# Patient Record
Sex: FEMALE | Race: WHITE | NOT HISPANIC OR LATINO | Employment: FULL TIME | URBAN - NONMETROPOLITAN AREA
[De-identification: names, ages, dates, MRNs, and addresses within clinical notes are randomized per-mention and may not be internally consistent; named-entity substitution may affect disease eponyms.]

---

## 2017-09-05 ENCOUNTER — OFFICE VISIT (OUTPATIENT)
Dept: FAMILY MEDICINE | Facility: CLINIC | Age: 67
End: 2017-09-05
Payer: COMMERCIAL

## 2017-09-05 VITALS
OXYGEN SATURATION: 99 % | SYSTOLIC BLOOD PRESSURE: 120 MMHG | BODY MASS INDEX: 21.46 KG/M2 | WEIGHT: 125 LBS | HEART RATE: 72 BPM | TEMPERATURE: 97.3 F | DIASTOLIC BLOOD PRESSURE: 62 MMHG | RESPIRATION RATE: 14 BRPM

## 2017-09-05 DIAGNOSIS — Z78.0 ASYMPTOMATIC POSTMENOPAUSAL STATUS: ICD-10-CM

## 2017-09-05 DIAGNOSIS — I10 ESSENTIAL HYPERTENSION WITH GOAL BLOOD PRESSURE LESS THAN 140/90: ICD-10-CM

## 2017-09-05 DIAGNOSIS — E78.5 HYPERLIPIDEMIA LDL GOAL <160: Primary | ICD-10-CM

## 2017-09-05 PROCEDURE — 99214 OFFICE O/P EST MOD 30 MIN: CPT | Performed by: NURSE PRACTITIONER

## 2017-09-05 RX ORDER — METOPROLOL SUCCINATE 25 MG/1
12.5 TABLET, EXTENDED RELEASE ORAL DAILY
Qty: 45 TABLET | Refills: 3 | Status: SHIPPED | OUTPATIENT
Start: 2017-09-05 | End: 2018-09-12

## 2017-09-05 NOTE — NURSING NOTE
"Chief Complaint   Patient presents with     Hypertension     recheck        Initial There were no vitals taken for this visit. Estimated body mass index is 22.55 kg/(m^2) as calculated from the following:    Height as of 9/27/16: 5' 4\" (1.626 m).    Weight as of 9/27/16: 131 lb 6.4 oz (59.6 kg).  Medication Reconciliation: complete    Health Maintenance that is potentially due pending provider review:  NONE    n/a    Is there anyone who you would like to be able to receive your results? No  If yes have patient fill out FRAN[facility]    "

## 2017-09-05 NOTE — PROGRESS NOTES
SUBJECTIVE:   Clarita Lowry is a 67 year old female who presents to clinic today for the following health issues:      Hypertension Follow-up      Outpatient blood pressures are not being checked.    Low Salt Diet: no added salt    Amount of exercise or physical activity:daily walking at work. No step tracking.     Problems taking medications regularly: No    Medication side effects: none  Diet: regular (no restrictions)    BP Readings from Last 3 Encounters:   09/05/17 120/62   09/27/16 130/64   03/08/16 134/60     Creatinine   Date Value Ref Range Status   09/27/2016 0.73 0.52 - 1.04 mg/dL Final       Non smoker  New puppy.     Sleep has been good. No problems with meds  Working 2-11 pm. Working [employer]  BP has been good.       -------------------------------------    Problem list and histories reviewed & adjusted, as indicated.  Additional history: as documented    Labs reviewed in EPIC    Reviewed and updated as needed this visit by clinical staffAllergies       Reviewed and updated as needed this visit by Provider         ROS:   ROS: 10 point ROS neg other than the symptoms noted above in the HPI.      OBJECTIVE:                                                    /62  Pulse 72  Temp 97.3  F (36.3  C) (Tympanic)  Resp 14  Wt 125 lb (56.7 kg)  SpO2 99%  BMI 21.46 kg/m2  Body mass index is 21.46 kg/(m^2).   GENERAL: healthy, alert, well nourished, well hydrated, no distress  HENT: ear canals- normal; TMs- normal; Nose- normal; Mouth- no ulcers, no lesions  NECK: no tenderness, no adenopathy, no asymmetry, no masses, no stiffness; thyroid- normal to palpation  RESP: lungs clear to auscultation - no rales, no rhonchi, no wheezes  CV: regular rates and rhythm, normal S1 S2, no S3 or S4 and no murmur, no click or rub -  ABDOMEN: soft, no tenderness, no  hepatosplenomegaly, no masses, normal bowel sounds  MS: extremities- no gross deformities noted, no edema  NEURO: strength and tone-  normal, sensory exam- grossly normal, mentation- intact, speech- normal, reflexes- symmetric  PSYCH: Alert and oriented times 3; speech- coherent , normal rate and volume; able to articulate logical thoughts, able to abstract reason, no tangential thoughts, no hallucinations or delusions, affect- normal    Diagnostic test results:  Results for orders placed or performed during the hospital encounter of 12/19/16   *MA Screening Digital Bilateral    Narrative    MA SCREENING DIGITAL BILATERAL 12/19/2016 10:15 AM    HISTORY:  Screening.  No new breast complaints.    COMPARISON:  12/16/2015, 12/8/2014, 12/4/2013, 11/26/2012    TECHNIQUE:  Digital mammography with CAD is performed.    BREAST DENSITY: Heterogeneously dense.    COMMENTS: No findings of suspicion for malignancy.       Impression    IMPRESSION: BI-RADS CATEGORY: 1 -  NEGATIVE.    RECOMMENDED FOLLOW-UP: Annual Mammography.    PETRA RYAN MD          ASSESSMENT/PLAN:                                                    1. Essential hypertension with goal blood pressure less than 140/90  Continue current meds.  RTC fasting for labs  - metoprolol (TOPROL-XL) 25 MG 24 hr tablet; Take 0.5 tablets (12.5 mg) by mouth daily  Dispense: 45 tablet; Refill: 3  - **Hepatitis C Screen Reflex to RNA FUTURE anytime; Future  - **Comprehensive metabolic panel FUTURE anytime; Future  - **Lipid panel reflex to direct LDL FUTURE 2mo; Future    2. Hyperlipidemia LDL goal <160  Labs orders placed.   Continue to work on daily activity and low fat diet.     3. Asymptomatic postmenopausal status  DEXA scan recommended.   - DX Hip/Pelvis/Spine; Future      Follow up with Provider - Call or return to the clinic with any worsening of symptoms or no resolution. Patient/Parent verbalized understanding and is in agreement. Medication side effects reviewed.   Current Outpatient Prescriptions   Medication Sig Dispense Refill     metoprolol (TOPROL-XL) 25 MG 24 hr tablet Take 0.5 tablets (12.5  mg) by mouth daily 45 tablet 3     aspirin 81 MG tablet Take by mouth daily 30 tablet         See Patient Instructions    LANA Wise [facility]

## 2017-09-05 NOTE — PATIENT INSTRUCTIONS
"  Discharge Instructions for High Blood Pressure (Hypertension)  You have been diagnosed with high blood pressure (also called hypertension). This means the force of blood against your artery walls is too strong. It also means your heart is working hard to move blood. High blood pressure usually has no symptoms, but over time, it can damage your heart, blood vessels, eyes, kidneys, and other organs. With help from your doctor, you can manage your blood pressure and protect your health.  Taking medicine    Learn to take your own blood pressure. Keep a record of your results. Ask your doctor which readings mean that you need medical attention.    Take your blood pressure medicine exactly as directed. Don t skip doses. Missing doses can cause your blood pressure to get out of control.    If you do miss a dose (or doses) check with your healthcare provider about what to do.    Avoid medicine that contain heart stimulants, including over-the-counter drugs. Check for warnings about high blood pressure on the label. Ask the pharmacist before purchasing something you haven't used before    Check with your doctor or pharmacist before taking a decongestant. Some decongestants can worsen high blood pressure.  Lifestyle changes    Maintain a healthy weight. Get help to lose any extra pounds.    Cut back on salt.    Limit canned, dried, packaged, and fast foods.    Don t add salt to your food at the table.    Season foods with herbs instead of salt when you cook.    Request no added salt when you go to a restaurant.    The American Heart Association s (AHA) \"ideal\" sodium intake recommendation is 1,500 milligrams per day.  However, since American's eat so much salt, the AHA says a positive change can occur by cutting back to even 2,400 milligrams of sodium a day.     Follow the DASH (Dietary Approaches to Stop Hypertension) eating plan. This plan recommends vegetables, fruits, whole gains, and other heart healthy foods.    Begin " an exercise program. Ask your doctor how to get started. The American Heart Association recommends aerobic exercise 3 to 4 times a week for an average of 40 minutes at a time, with your doctor's approval. Simple activities like walking or gardening can help.    Break the smoking habit. Enroll in a stop-smoking program to improve your chances of success. Ask your healthcare provider about programs and medicines to help you stop smoking.    Limit drinks that contain caffeine (coffee, black or green tea, cola) to 2 per day.    Never take stimulants such as amphetamines or cocaine; these drugs can be deadly for someone with high blood pressure.    Control your stress. Learn stress-management techniques.    Limit alcohol to no more than 1 drink a day for women and 2 drinks a day for men.  Follow-up care  Make a follow-up appointment as directed by our staff.     When to seek medical care  Call your doctor immediately or seek emergency care if you have any of the following:    Chest pain or shortness of breath (call 911)    Moderate to severe headache    Weakness in the muscles of your face, arms, or legs    Trouble speaking    Extreme drowsiness    Confusion    Fainting or dizziness    Pulsating or rushing sound in your ears    Unexplained nosebleed    Weakness, tingling, or numbness of your face, arms, or legs    Change in vision    Blood pressure measured at home that is greater than 180/110   Date Last Reviewed: 4/27/2016 2000-2017 [employer]. [address] [place] [address], [place] [address]. All rights reserved. This information is not intended as a substitute for professional medical care. Always follow your healthcare professional's instructions.

## 2017-09-05 NOTE — LETTER
[facility]  [address] [facility] [address]  [phone]        January 8, 2018    Clarita Lowry  [address] [facility] [address]              Dear Clarita Lowry    This is to remind you that your fasting lab is due.    You may call our office at  to schedule an appointment.    Please disregard this notice if you have already had your labs drawn or made an appointment.        Sincerely,        Claire Amos CNP

## 2017-10-16 ENCOUNTER — HOSPITAL ENCOUNTER (OUTPATIENT)
Dept: BONE DENSITY | Facility: CLINIC | Age: 67
Discharge: HOME | End: 2017-10-16
Attending: NURSE PRACTITIONER | Admitting: NURSE PRACTITIONER
Payer: COMMERCIAL

## 2017-10-16 DIAGNOSIS — Z78.0 ASYMPTOMATIC POSTMENOPAUSAL STATUS: ICD-10-CM

## 2017-10-16 PROCEDURE — 77080 DXA BONE DENSITY AXIAL: CPT

## 2018-01-03 ENCOUNTER — HOSPITAL ENCOUNTER (OUTPATIENT)
Dept: MAMMOGRAPHY | Facility: CLINIC | Age: 68
Discharge: HOME | End: 2018-01-03
Attending: NURSE PRACTITIONER | Admitting: NURSE PRACTITIONER
Payer: COMMERCIAL

## 2018-01-03 DIAGNOSIS — Z12.31 VISIT FOR SCREENING MAMMOGRAM: ICD-10-CM

## 2018-01-03 PROCEDURE — 77067 SCR MAMMO BI INCL CAD: CPT

## 2018-01-12 DIAGNOSIS — I10 ESSENTIAL HYPERTENSION WITH GOAL BLOOD PRESSURE LESS THAN 140/90: ICD-10-CM

## 2018-01-12 LAB
ALBUMIN SERPL-MCNC: 3.9 G/DL (ref 3.4–5)
ALP SERPL-CCNC: 79 U/L (ref 40–150)
ALT SERPL W P-5'-P-CCNC: 20 U/L (ref 0–50)
ANION GAP SERPL CALCULATED.3IONS-SCNC: 5 MMOL/L (ref 3–14)
AST SERPL W P-5'-P-CCNC: 20 U/L (ref 0–45)
BILIRUB SERPL-MCNC: 0.4 MG/DL (ref 0.2–1.3)
BUN SERPL-MCNC: 11 MG/DL (ref 7–30)
CALCIUM SERPL-MCNC: 8.8 MG/DL (ref 8.5–10.1)
CHLORIDE SERPL-SCNC: 103 MMOL/L (ref 94–109)
CHOLEST SERPL-MCNC: 179 MG/DL
CO2 SERPL-SCNC: 29 MMOL/L (ref 20–32)
CREAT SERPL-MCNC: 0.82 MG/DL (ref 0.52–1.04)
GFR SERPL CREATININE-BSD FRML MDRD: 70 ML/MIN/1.7M2
GLUCOSE SERPL-MCNC: 93 MG/DL (ref 70–99)
HDLC SERPL-MCNC: 62 MG/DL
LDLC SERPL CALC-MCNC: 89 MG/DL
NONHDLC SERPL-MCNC: 117 MG/DL
POTASSIUM SERPL-SCNC: 4 MMOL/L (ref 3.4–5.3)
PROT SERPL-MCNC: 7 G/DL (ref 6.8–8.8)
SODIUM SERPL-SCNC: 137 MMOL/L (ref 133–144)
TRIGL SERPL-MCNC: 140 MG/DL

## 2018-01-12 PROCEDURE — 86803 HEPATITIS C AB TEST: CPT | Performed by: NURSE PRACTITIONER

## 2018-01-12 PROCEDURE — 36415 COLL VENOUS BLD VENIPUNCTURE: CPT | Performed by: NURSE PRACTITIONER

## 2018-01-12 PROCEDURE — 80061 LIPID PANEL: CPT | Performed by: NURSE PRACTITIONER

## 2018-01-12 PROCEDURE — 80053 COMPREHEN METABOLIC PANEL: CPT | Performed by: NURSE PRACTITIONER

## 2018-01-15 LAB — HCV AB SERPL QL IA: NONREACTIVE

## 2018-09-12 ENCOUNTER — OFFICE VISIT (OUTPATIENT)
Dept: FAMILY MEDICINE | Facility: CLINIC | Age: 68
End: 2018-09-12
Payer: COMMERCIAL

## 2018-09-12 VITALS
RESPIRATION RATE: 14 BRPM | OXYGEN SATURATION: 99 % | WEIGHT: 124 LBS | BODY MASS INDEX: 21.28 KG/M2 | DIASTOLIC BLOOD PRESSURE: 80 MMHG | SYSTOLIC BLOOD PRESSURE: 138 MMHG | TEMPERATURE: 97 F | HEART RATE: 80 BPM

## 2018-09-12 DIAGNOSIS — I10 ESSENTIAL HYPERTENSION WITH GOAL BLOOD PRESSURE LESS THAN 140/90: ICD-10-CM

## 2018-09-12 PROCEDURE — 99214 OFFICE O/P EST MOD 30 MIN: CPT | Performed by: NURSE PRACTITIONER

## 2018-09-12 RX ORDER — METOPROLOL SUCCINATE 25 MG/1
25 TABLET, EXTENDED RELEASE ORAL DAILY
Qty: 90 TABLET | Refills: 3 | Status: SHIPPED | OUTPATIENT
Start: 2018-09-12 | End: 2020-08-12

## 2018-09-12 NOTE — PROGRESS NOTES
SUBJECTIVE:   Clarita Lowry is a 68 year old female who presents to clinic today for the following health issues:      Hypertension Follow-up      Outpatient blood pressures are not being checked.    Low Salt Diet: no added salt      Amount of exercise or physical activity: walking     Problems taking medications regularly: No    Medication side effects: none    Diet: regular (no restrictions)    BP Readings from Last 3 Encounters:   09/12/18 138/80   09/05/17 120/62   09/27/16 130/64       /80  Pulse 80  Temp 97  F (36.1  C) (Tympanic)  Resp 14  Wt 124 lb (56.2 kg)  SpO2 99%  BMI 21.28 kg/m2    -------------------------------------    Problem list and histories reviewed & adjusted, as indicated.  Additional history: as documented    BP Readings from Last 3 Encounters:   09/12/18 138/80   09/05/17 120/62   09/27/16 130/64    Wt Readings from Last 3 Encounters:   09/12/18 124 lb (56.2 kg)   09/05/17 125 lb (56.7 kg)   09/27/16 131 lb 6.4 oz (59.6 kg)                  Labs reviewed in EPIC    Reviewed and updated as needed this visit by clinical staff  Allergies  Meds       Reviewed and updated as needed this visit by Provider         ROS:   ROS: 10 point ROS neg other than the symptoms noted above in the HPI.      OBJECTIVE:                                                    /80  Pulse 80  Temp 97  F (36.1  C) (Tympanic)  Resp 14  Wt 124 lb (56.2 kg)  SpO2 99%  BMI 21.28 kg/m2  Body mass index is 21.28 kg/(m^2).   GENERAL: healthy, alert, well nourished, well hydrated, no distress  HENT: ear canals- normal; TMs- normal; Nose- normal; Mouth- no ulcers, no lesions  NECK: no tenderness, no adenopathy, no asymmetry, no masses, no stiffness; thyroid- normal to palpation  RESP: lungs clear to auscultation - no rales, no rhonchi, no wheezes  CV: regular rates and rhythm, normal S1 S2, no S3 or S4 and no murmur, no click or rub -  ABDOMEN: soft, no tenderness, no  hepatosplenomegaly, no  masses, normal bowel sounds    Diagnostic test results:  Results for orders placed or performed in visit on 01/12/18   **Hepatitis C Screen Reflex to RNA FUTURE anytime   Result Value Ref Range    Hepatitis C Antibody Nonreactive NR^Nonreactive   **Comprehensive metabolic panel FUTURE anytime   Result Value Ref Range    Sodium 137 133 - 144 mmol/L    Potassium 4.0 3.4 - 5.3 mmol/L    Chloride 103 94 - 109 mmol/L    Carbon Dioxide 29 20 - 32 mmol/L    Anion Gap 5 3 - 14 mmol/L    Glucose 93 70 - 99 mg/dL    Urea Nitrogen 11 7 - 30 mg/dL    Creatinine 0.82 0.52 - 1.04 mg/dL    GFR Estimate 70 >60 mL/min/1.7m2    GFR Estimate If Black 85 >60 mL/min/1.7m2    Calcium 8.8 8.5 - 10.1 mg/dL    Bilirubin Total 0.4 0.2 - 1.3 mg/dL    Albumin 3.9 3.4 - 5.0 g/dL    Protein Total 7.0 6.8 - 8.8 g/dL    Alkaline Phosphatase 79 40 - 150 U/L    ALT 20 0 - 50 U/L    AST 20 0 - 45 U/L   **Lipid panel reflex to direct LDL FUTURE 2mo   Result Value Ref Range    Cholesterol 179 <200 mg/dL    Triglycerides 140 <150 mg/dL    HDL Cholesterol 62 >49 mg/dL    LDL Cholesterol Calculated 89 <100 mg/dL    Non HDL Cholesterol 117 <130 mg/dL        ASSESSMENT/PLAN:                                                    1. Essential hypertension with goal blood pressure less than 140/90  Increase dose.  - metoprolol succinate (TOPROL-XL) 25 MG 24 hr tablet; Take 1 tablet (25 mg) by mouth daily  Dispense: 90 tablet; Refill: 3  DASH diet encouraged.  Recheck blood pressure and heart rate 1 month      Follow up with Provider - Call or return to the clinic with any worsening of symptoms or no resolution. Patient/Parent verbalized understanding and is in agreement. Medication side effects reviewed.   Current Outpatient Prescriptions   Medication Sig Dispense Refill     aspirin 81 MG tablet Take by mouth daily 30 tablet      metoprolol succinate (TOPROL-XL) 25 MG 24 hr tablet Take 1 tablet (25 mg) by mouth daily 90 tablet 3        See Patient  Instructions    LANA Wise CNP  [facility]

## 2018-09-12 NOTE — PATIENT INSTRUCTIONS
Low-Salt Choices  Eating salt (sodium) can make your body retain too much water. Excess water makes your heart work harder. Canned, packaged, and frozen foods are easy to prepare. But they are often high in sodium. Here are some ideas for low-salt foods you can easily make yourself.    For breakfast    Fruit or 100% fruit juice    Whole-wheat bread or an English muffin. Look for sodium content on Nutrition Facts labels.    Low-fat milk or yogurt    Unsalted eggs    Shredded wheat    Corn tortillas    Unsalted steamed rice    Regular (not instant) hot cereal, made without salt  Stay away from:    Sausage, hernandez, and ham    Flour tortillas    Packaged muffins, pancakes, and biscuits    Instant hot cereals    Cottage cheese  For lunch and dinner    Fresh fish, chicken, turkey, or meat--baked, broiled, or roasted without salt    Dry beans, cooked without salt    Tofu, stir-fried without salt    Unsalted fresh fruit and vegetables, or frozen or canned fruit and vegetables with no added salt  Stay away from:    Lunch or deli meat that is cured or smoked    Cheese    Tomato juice and ketchup    Canned vegetables, soups, and fish not labeled as no-salt-added or reduced sodium    Packaged gravies and sauces    Olives, pickles, and relish    Bottled salad dressings  For snacks and desserts    Yogurt    Unsalted, air-popped popcorn    Unsalted nuts or seeds  Stay away from:    Pies and cakes    Packaged dessert mixes    Pizza    Canned and packaged puddings    Pretzels, chips, crackers, and nuts--unless the label says unsalted  Date Last Reviewed: 6/1/2017 2000-2017 [employer]. [address] [place] [address], [place] [address]. All rights reserved. This information is not intended as a substitute for professional medical care. Always follow your healthcare professional's instructions.        Controlling High Blood Pressure  High blood pressure (hypertension) is often called the silent killer. This is because many  people who have it don t know it. High blood pressure can raise your risk of heart attack, stroke, and heart failure. Controlling your blood pressure can decrease your risk of these problems. Know your blood pressure and remember to check it regularly. Doing so can save your life.  Blood pressure measurements are given as 2 numbers. Systolic blood pressure is the upper number. This is the pressure when the heart contracts. Diastolic blood pressure is the lower number. This is the pressure when the heart relaxes between beats.  Blood pressure is categorized as normal, elevated, or stage 1 or stage 2 high blood pressure:    Normal blood pressure is systolic of less than 120 and diastolic of less than 80 (120/80)    Elevated blood pressure is systolic of 120 to 129 and diastolic less than 80    Stage 1 high blood pressure is systolic is 130 to 139 or diastolic between 80 to 89    Stage 2 high blood pressure is when systolic is 140 or higher or the diastolic is 90 or higher  Here are some things you can do to help control your blood pressure.    Choose heart-healthy foods    Select low-salt, low-fat foods. Limit sodium intake to 2,400 mg per day or the amount suggested by your healthcare provider.    Limit canned, dried, cured, packaged, and fast foods. These can contain a lot of salt.    Eat 8 to 10 servings of fruits and vegetables every day.    Choose lean meats, fish, or chicken.    Eat whole-grain pasta, brown rice, and beans.    Eat 2 to 3 servings of low-fat or fat-free dairy products.    Ask your doctor about the DASH eating plan. This plan helps reduce blood pressure.    When you go to a restaurant, ask that your meal be prepared with no added salt.  Maintain a healthy weight    Ask your healthcare provider how many calories to eat a day. Then stick to that number.    Ask your healthcare provider what weight range is healthiest for you. If you are overweight, a weight loss of only 3% to 5% of your body  weight can help lower blood pressure. Generally, a good weight loss goal is to lose 10% of your body weight in a year.    Limit snacks and sweets.    Get regular exercise.  Get up and get active    Choose activities you enjoy. Find ones you can do with friends or family. This includes bicycling, dancing, walking, and jogging.    Park farther away from building entrances.    Use stairs instead of the elevator.    When you can, walk or bike instead of driving.    [place] leaves, garden, or do household repairs.    Be active at a moderate to vigorous level of physical activity for at least 40 minutes for a minimum of 3 to 4 days a week.   Manage stress    Make time to relax and enjoy life. Find time to laugh.    Communicate your concerns with your loved ones and your healthcare provider.    Visit with family and friends, and keep up with hobbies.  Limit alcohol and quit smoking    Men should have no more than 2 drinks per day.    Women should have no more than 1 drink per day.    Talk with your healthcare provider about quitting smoking. Smoking significantly increases your risk for heart disease and stroke. Ask your healthcare provider about community smoking cessation programs and other options.  Medicines  If lifestyle changes aren t enough, your healthcare provider may prescribe high blood pressure medicine. Take all medicines as prescribed. If you have any questions about your medicines, ask your healthcare provider before stopping or changing them.   Date Last Reviewed: 4/27/2016 2000-2017 [employer] [address] [facility], [place] [address]. All rights reserved. This information is not intended as a substitute for professional medical care. Always follow your healthcare professional's instructions.

## 2018-09-12 NOTE — MR AVS SNAPSHOT
After Visit Summary   9/12/2018    Clarita Lowry    MRN: [id number]           Patient Information     Date Of Birth          1950        Visit Information        Provider Department      9/12/2018 10:20 AM Claire Amos APRN [facility]        Today's Diagnoses     Essential hypertension with goal blood pressure less than 140/90          Care Instructions      Low-Salt Choices  Eating salt (sodium) can make your body retain too much water. Excess water makes your heart work harder. Canned, packaged, and frozen foods are easy to prepare. But they are often high in sodium. Here are some ideas for low-salt foods you can easily make yourself.    For breakfast    Fruit or 100% fruit juice    Whole-wheat bread or an English muffin. Look for sodium content on Nutrition Facts labels.    Low-fat milk or yogurt    Unsalted eggs    Shredded wheat    Corn tortillas    Unsalted steamed rice    Regular (not instant) hot cereal, made without salt  Stay away from:    Sausage, hernandez, and ham    Flour tortillas    Packaged muffins, pancakes, and biscuits    Instant hot cereals    Cottage cheese  For lunch and dinner    Fresh fish, chicken, turkey, or meat--baked, broiled, or roasted without salt    Dry beans, cooked without salt    Tofu, stir-fried without salt    Unsalted fresh fruit and vegetables, or frozen or canned fruit and vegetables with no added salt  Stay away from:    Lunch or deli meat that is cured or smoked    Cheese    Tomato juice and ketchup    Canned vegetables, soups, and fish not labeled as no-salt-added or reduced sodium    Packaged gravies and sauces    Olives, pickles, and relish    Bottled salad dressings  For snacks and desserts    Yogurt    Unsalted, air-popped popcorn    Unsalted nuts or seeds  Stay away from:    Pies and cakes    Packaged dessert mixes    Pizza    Canned and packaged puddings    Pretzels, chips, crackers, and nuts--unless the label  says unsalted  Date Last Reviewed: 6/1/2017 2000-2017 [employer]. [address] [place] [address], [place] [address]. All rights reserved. This information is not intended as a substitute for professional medical care. Always follow your healthcare professional's instructions.        Controlling High Blood Pressure  High blood pressure (hypertension) is often called the silent killer. This is because many people who have it don t know it. High blood pressure can raise your risk of heart attack, stroke, and heart failure. Controlling your blood pressure can decrease your risk of these problems. Know your blood pressure and remember to check it regularly. Doing so can save your life.  Blood pressure measurements are given as 2 numbers. Systolic blood pressure is the upper number. This is the pressure when the heart contracts. Diastolic blood pressure is the lower number. This is the pressure when the heart relaxes between beats.  Blood pressure is categorized as normal, elevated, or stage 1 or stage 2 high blood pressure:    Normal blood pressure is systolic of less than 120 and diastolic of less than 80 (120/80)    Elevated blood pressure is systolic of 120 to 129 and diastolic less than 80    Stage 1 high blood pressure is systolic is 130 to 139 or diastolic between 80 to 89    Stage 2 high blood pressure is when systolic is 140 or higher or the diastolic is 90 or higher  Here are some things you can do to help control your blood pressure.    Choose heart-healthy foods    Select low-salt, low-fat foods. Limit sodium intake to 2,400 mg per day or the amount suggested by your healthcare provider.    Limit canned, dried, cured, packaged, and fast foods. These can contain a lot of salt.    Eat 8 to 10 servings of fruits and vegetables every day.    Choose lean meats, fish, or chicken.    Eat whole-grain pasta, brown rice, and beans.    Eat 2 to 3 servings of low-fat or fat-free dairy products.    Ask your  doctor about the DASH eating plan. This plan helps reduce blood pressure.    When you go to a restaurant, ask that your meal be prepared with no added salt.  Maintain a healthy weight    Ask your healthcare provider how many calories to eat a day. Then stick to that number.    Ask your healthcare provider what weight range is healthiest for you. If you are overweight, a weight loss of only 3% to 5% of your body weight can help lower blood pressure. Generally, a good weight loss goal is to lose 10% of your body weight in a year.    Limit snacks and sweets.    Get regular exercise.  Get up and get active    Choose activities you enjoy. Find ones you can do with friends or family. This includes bicycling, dancing, walking, and jogging.    Park farther away from building entrances.    Use stairs instead of the elevator.    When you can, walk or bike instead of driving.    [place] leaves, garden, or do household repairs.    Be active at a moderate to vigorous level of physical activity for at least 40 minutes for a minimum of 3 to 4 days a week.   Manage stress    Make time to relax and enjoy life. Find time to laugh.    Communicate your concerns with your loved ones and your healthcare provider.    Visit with family and friends, and keep up with hobbies.  Limit alcohol and quit smoking    Men should have no more than 2 drinks per day.    Women should have no more than 1 drink per day.    Talk with your healthcare provider about quitting smoking. Smoking significantly increases your risk for heart disease and stroke. Ask your healthcare provider about community smoking cessation programs and other options.  Medicines  If lifestyle changes aren t enough, your healthcare provider may prescribe high blood pressure medicine. Take all medicines as prescribed. If you have any questions about your medicines, ask your healthcare provider before stopping or changing them.   Date Last Reviewed: 4/27/2016 2000-2017 [facility]  [employer]. [address] [place] [address]. All rights reserved. This information is not intended as a substitute for professional medical care. Always follow your healthcare professional's instructions.                Follow-ups after your visit        Who to contact     If you have questions or need follow up information about today's clinic visit or your schedule please contact [facility] directly at [phone].  Normal or non-critical lab and imaging results will be communicated to you by MyChart, letter or phone within 4 business days after the clinic has received the results. If you do not hear from us within 7 days, please contact the clinic through MyChart or phone. If you have a critical or abnormal lab result, we will notify you by phone as soon as possible.  Submit refill requests through [employer] or call your pharmacy and they will forward the refill request to us. Please allow 3 business days for your refill to be completed.          Additional Information About Your Visit        Care EveryWhere ID     This is your Care EveryWhere ID. This could be used by other organizations to access your [facility] medical records  [id number]        Your Vitals Were     Pulse Temperature Respirations Pulse Oximetry BMI (Body Mass Index)       80 97  F (36.1  C) (Tympanic) 14 99% 21.28 kg/m2        Blood Pressure from Last 3 Encounters:   09/12/18 138/80   09/05/17 120/62   09/27/16 130/64    Weight from Last 3 Encounters:   09/12/18 124 lb (56.2 kg)   09/05/17 125 lb (56.7 kg)   09/27/16 131 lb 6.4 oz (59.6 kg)              Today, you had the following     No orders found for display         Today's Medication Changes          These changes are accurate as of 9/12/18 10:41 AM.  If you have any questions, ask your nurse or doctor.               These medicines have changed or have updated prescriptions.        Dose/Directions    metoprolol succinate 25 MG 24 hr tablet   Commonly known  as:  TOPROL-XL   This may have changed:  how much to take   Used for:  Essential hypertension with goal blood pressure less than 140/90   Changed by:  Claire Amos APRN CNP        Dose:  25 mg   Take 1 tablet (25 mg) by mouth daily   Quantity:  90 tablet   Refills:  3            Where to get your medicines      These medications [id number] [place] [place] [facility] [address] [facility]  [address] , [facility] [address]     Phone:  [phone]     metoprolol succinate 25 MG 24 hr tablet                Primary Care Provider Office Phone # Fax #    LANA Wise [phone]       [address] [facility] [address]        Equal Access to Services     SAIDA [facility] : Hadii franco kim hadasho Sojoselin, waaxda luqadaha, qaybta kaalmada adeegyada, markell richardson . So [facility] [phone].    ATENCIÓN: Si habla español, tiene a castañeda disposición servicios gratuitos de asistencia lingüística. [facility] [phone].    We comply with applicable federal civil rights laws and [place] laws. We do not discriminate on the basis of race, color, national origin, age, disability, sex, sexual orientation, or gender identity.            Thank you!     Thank you for choosing [facility]  for your care. Our goal is always to provide you with excellent care. Hearing back from our patients is one way we can continue to improve our services. Please take a few minutes to complete the written survey that you may receive in the mail after your visit with us. Thank you!             Your Updated Medication List - Protect others around you: Learn how to safely use, store and throw away your medicines at www.disposemymeds.org.          This list is accurate as of 9/12/18 10:41 AM.  Always use your most recent med list.                   Brand Name Dispense Instructions for use Diagnosis    aspirin 81 MG tablet     30 tablet    Take by mouth daily    HTN,  goal below 140/90       metoprolol succinate 25 MG 24 hr tablet    TOPROL-XL    90 tablet    Take 1 tablet (25 mg) by mouth daily    Essential hypertension with goal blood pressure less than 140/90

## 2019-01-14 ENCOUNTER — ANCILLARY PROCEDURE (OUTPATIENT)
Dept: MAMMOGRAPHY | Facility: CLINIC | Age: 69
End: 2019-01-14
Attending: NURSE PRACTITIONER
Payer: COMMERCIAL

## 2019-01-14 DIAGNOSIS — Z12.31 VISIT FOR SCREENING MAMMOGRAM: ICD-10-CM

## 2019-01-14 PROCEDURE — 77067 SCR MAMMO BI INCL CAD: CPT | Mod: TC

## 2019-08-28 ENCOUNTER — OFFICE VISIT (OUTPATIENT)
Dept: FAMILY MEDICINE | Facility: CLINIC | Age: 69
End: 2019-08-28
Payer: COMMERCIAL

## 2019-08-28 VITALS
SYSTOLIC BLOOD PRESSURE: 148 MMHG | HEIGHT: 64 IN | BODY MASS INDEX: 21.34 KG/M2 | HEART RATE: 68 BPM | TEMPERATURE: 96.3 F | DIASTOLIC BLOOD PRESSURE: 70 MMHG | RESPIRATION RATE: 14 BRPM | OXYGEN SATURATION: 99 % | WEIGHT: 125 LBS

## 2019-08-28 DIAGNOSIS — Z00.00 ENCOUNTER FOR MEDICARE ANNUAL WELLNESS EXAM: Primary | ICD-10-CM

## 2019-08-28 DIAGNOSIS — I10 ESSENTIAL HYPERTENSION WITH GOAL BLOOD PRESSURE LESS THAN 140/90: ICD-10-CM

## 2019-08-28 PROCEDURE — 99213 OFFICE O/P EST LOW 20 MIN: CPT | Mod: 25 | Performed by: NURSE PRACTITIONER

## 2019-08-28 PROCEDURE — 99397 PER PM REEVAL EST PAT 65+ YR: CPT | Performed by: NURSE PRACTITIONER

## 2019-08-28 RX ORDER — METOPROLOL SUCCINATE 25 MG/1
25 TABLET, EXTENDED RELEASE ORAL DAILY
Qty: 90 TABLET | Refills: 3 | Status: CANCELLED | OUTPATIENT
Start: 2019-08-28

## 2019-08-28 RX ORDER — METOPROLOL SUCCINATE 50 MG/1
50 TABLET, EXTENDED RELEASE ORAL DAILY
Qty: 90 TABLET | Refills: 3 | Status: SHIPPED | OUTPATIENT
Start: 2019-08-28 | End: 2020-08-12

## 2019-08-28 ASSESSMENT — ENCOUNTER SYMPTOMS
DIZZINESS: 0
SHORTNESS OF BREATH: 0
COUGH: 0
WEAKNESS: 0
HEADACHES: 0
PARESTHESIAS: 0
HEMATURIA: 0
BREAST MASS: 0
EYE PAIN: 0
NAUSEA: 0
FREQUENCY: 0
PALPITATIONS: 0
ARTHRALGIAS: 0
HEMATOCHEZIA: 0
DYSURIA: 0
ABDOMINAL PAIN: 0
JOINT SWELLING: 0
CONSTIPATION: 0
NERVOUS/ANXIOUS: 0
FEVER: 0
DIARRHEA: 0
MYALGIAS: 0
SORE THROAT: 0
CHILLS: 0
HEARTBURN: 0

## 2019-08-28 ASSESSMENT — ACTIVITIES OF DAILY LIVING (ADL): CURRENT_FUNCTION: NO ASSISTANCE NEEDED

## 2019-08-28 ASSESSMENT — MIFFLIN-ST. JEOR: SCORE: 1087

## 2019-08-28 NOTE — PATIENT INSTRUCTIONS
Patient Education   Personalized Prevention Plan  You are due for the preventive services outlined below.  Your care team is available to assist you in scheduling these services.  If you have already completed any of these items, please share that information with your care team to update in your medical record.  Health Maintenance Due   Topic Date Due     Discuss Advance Care Planning  1950     Zoster (Shingles) Vaccine (1 of 2) 03/15/2002     Pneumococcal Vaccine (1 of 2 - PCV13) 03/15/2017     Annual Wellness Visit  09/27/2017     PHQ-2  01/01/2019     FALL RISK ASSESSMENT  09/12/2019     HPV Screening  09/27/2019

## 2019-08-28 NOTE — PROGRESS NOTES
"SUBJECTIVE:   Clarita Lowry is a 69 year old female who presents for Preventive Visit.    Are you in the first 12 months of your Medicare coverage?  No    Healthy Habits:     In general, how would you rate your overall health?  Good    Frequency of exercise:  None    Do you usually eat at least 4 servings of fruit and vegetables a day, include whole grains    & fiber and avoid regularly eating high fat or \"junk\" foods?  No    Taking medications regularly:  Yes    Medication side effects:  None    Ability to successfully perform activities of daily living:  No assistance needed    Home Safety:  No safety concerns identified    Hearing Impairment:  No hearing concerns    In the past 6 months, have you been bothered by leaking of urine?  No    In general, how would you rate your overall mental or emotional health?  Excellent      PHQ-2 Total Score: 0    Additional concerns today:  No    Do you feel safe in your environment? Yes    Do you have a Health Care Directive? Yes: Advance Directive has been received and scanned.      Fall risk  Fallen 2 or more times in the past year?: No  Any fall with injury in the past year?: No  click delete button to remove this line now  Cognitive Screening   1) Repeat 3 items (Leader, Season, Table)    2) Clock draw: NORMAL  3) 3 item recall: Recalls 3 objects  Results: 3 items recalled: COGNITIVE IMPAIRMENT LESS LIKELY    Mini-CogTM Copyright S [employer]. Licensed by the author for use in [facility]; reprinted with permission (tulio@.[facility]). All rights reserved.      Do you have sleep apnea, excessive snoring or daytime drowsiness?: no    Reviewed and updated as needed this visit by clinical staff  Tobacco  Allergies  Meds  Med Hx  Surg Hx  Fam Hx  Soc Hx      Subjective:   Clarita Lowry is a 69 year old female with hypertension.  Current Outpatient Medications   Medication Sig Dispense Refill     aspirin 81 MG tablet Take by mouth daily 30 tablet      metoprolol " succinate (TOPROL-XL) 25 MG 24 hr tablet Take 1 tablet (25 mg) by mouth daily 90 tablet 3      Hypertension ROS: taking medications as instructed, no medication side effects noted, no TIA's, no chest pain on exertion, no dyspnea on exertion, no swelling of ankles.     Reviewed and updated as needed this visit by Provider        Social History     Tobacco Use     Smoking status: Former Smoker     Packs/day: 1.00     Years: 35.00     Pack years: 35.00     Types: Cigarettes     Last attempt to quit: 3/13/2012     Years since quittin.4     Smokeless tobacco: Never Used   Substance Use Topics     Alcohol use: Yes     Comment: few drinks a year     If you drink alcohol do you typically have >3 drinks per day or >7 drinks per week? No    Alcohol Use 2019   Prescreen: >3 drinks/day or >7 drinks/week? No   Prescreen: >3 drinks/day or >7 drinks/week? -   No flowsheet data found.    -------------------------------------    Current providers sharing in care for this patient include:   Patient Care Team:  Claire Amos APRN CNP as PCP - General (Family Practice)  Claire Amos APRN CNP as Assigned PCP    The following health maintenance items are reviewed in Epic and correct as of today:  Health Maintenance   Topic Date Due     ADVANCE CARE PLANNING  1950     ZOSTER IMMUNIZATION (1 of 2) 03/15/2002     PNEUMOCOCCAL IMMUNIZATION 65+ LOW/MEDIUM RISK (1 of 2 - PCV13) 03/15/2017     MEDICARE ANNUAL WELLNESS VISIT  2017     PHQ-2  2019     FALL RISK ASSESSMENT  2019     HPV  2019     INFLUENZA VACCINE (1) 2019     MAMMO SCREENING  2021     PAP  2021     LIPID  2023     DTAP/TDAP/TD IMMUNIZATION (3 - Td) 2025     COLONOSCOPY  2025     DEXA  Completed     HEPATITIS C SCREENING  Completed     IPV IMMUNIZATION  Aged Out     MENINGITIS IMMUNIZATION  Aged Out     Lab work is in process  Labs reviewed in EPIC  BP Readings from Last 3 Encounters:  "  08/28/19 (!) 148/70   09/12/18 138/80   09/05/17 120/62    Wt Readings from Last 3 Encounters:   08/28/19 56.7 kg (125 lb)   09/12/18 56.2 kg (124 lb)   09/05/17 56.7 kg (125 lb)          Review of Systems   Constitutional: Negative for chills and fever.   HENT: Negative for congestion, ear pain, hearing loss and sore throat.    Eyes: Negative for pain and visual disturbance.   Respiratory: Negative for cough and shortness of breath.    Cardiovascular: Negative for chest pain, palpitations and peripheral edema.   Gastrointestinal: Negative for abdominal pain, constipation, diarrhea, heartburn, hematochezia and nausea.   Breasts:  Negative for tenderness, breast mass and discharge.   Genitourinary: Negative for dysuria, frequency, genital sores, hematuria, pelvic pain, urgency, vaginal bleeding and vaginal discharge.   Musculoskeletal: Negative for arthralgias, joint swelling and myalgias.   Skin: Negative for rash.   Neurological: Negative for dizziness, weakness, headaches and paresthesias.   Psychiatric/Behavioral: Negative for mood changes. The patient is not nervous/anxious.      OBJECTIVE:   BP (!) 148/70   Pulse 68   Temp 96.3  F (35.7  C) (Tympanic)   Resp 14   Ht 1.626 m (5' 4\")   Wt 56.7 kg (125 lb)   SpO2 99%   BMI 21.46 kg/m   Estimated body mass index is 21.46 kg/m  as calculated from the following:    Height as of this encounter: 1.626 m (5' 4\").    Weight as of this encounter: 56.7 kg (125 lb).  Physical Exam  GENERAL APPEARANCE: healthy, alert and no distress  EYES: Eyes grossly normal to inspection, PERRL and conjunctivae and sclerae normal  HENT: ear canals and TM's normal, nose and mouth without ulcers or lesions, oropharynx clear and oral mucous membranes moist  NECK: no adenopathy, no asymmetry, masses, or scars and thyroid normal to palpation  RESP: lungs clear to auscultation - no rales, rhonchi or wheezes  BREAST: normal without masses, tenderness or nipple discharge and no palpable " axillary masses or adenopathy  CV: regular rate and rhythm, normal S1 S2, no S3 or S4, no murmur, click or rub, no peripheral edema and peripheral pulses strong  ABDOMEN: soft, nontender, no hepatosplenomegaly, no masses and bowel sounds normal  MS: no musculoskeletal defects are noted and gait is age appropriate without ataxia  SKIN: no suspicious lesions or rashes  NEURO: Normal strength and tone, sensory exam grossly normal, mentation intact and speech normal  PSYCH: mentation appears normal and affect normal/bright    Diagnostic Test Results:  Labs reviewed in Epic  Results for orders placed or performed in visit on 01/14/19   *MA Screening Digital Bilateral    Narrative    MA SCREENING DIGITAL BILATERAL 1/14/2019 10:15 AM    HISTORY:  Screening.  No new breast complaints.    COMPARISON:  01/03/2018, 12/19/2016, 12/16/2015, 12/8/2014    TECHNIQUE:  Digital mammography with CAD is performed.    BREAST DENSITY: Heterogeneously dense.    COMMENTS: No findings of suspicion for malignancy.       Impression    IMPRESSION: BI-RADS CATEGORY: 1 -  NEGATIVE.    RECOMMENDED FOLLOW-UP: Annual Mammography.    PETRA RYAN MD       ASSESSMENT / PLAN:   Clarita was seen today for physical.    Diagnoses and all orders for this visit:    Encounter for Medicare annual wellness exam    Essential hypertension with goal blood pressure less than 140/90  -     metoprolol succinate ER (TOPROL-XL) 50 MG 24 hr tablet; Take 1 tablet (50 mg) by mouth daily        Increase Toprol-XL to 75 mg daily  Recheck blood pressure and heart rate in 2 weeks  Low-sodium diet recommended  Daily physical activity recommended  orders and follow up as documented in EpicCare, reviewed diet, exercise and weight control, recommended sodium restriction, reviewed medications and side effects in detail, use of aspirin to prevent MI and TIA's discussed.  End of Life Planning:  Patient currently has an advanced directive: No.  I have verified the patient's ablity to  "prepare an advanced directive/make health care decisions.  Literature was provided to assist patient in preparing an advanced directive.    COUNSELING:  Reviewed preventive health counseling, as reflected in patient instructions    Estimated body mass index is 21.46 kg/m  as calculated from the following:    Height as of this encounter: 1.626 m (5' 4\").    Weight as of this encounter: 56.7 kg (125 lb).    Weight management plan noted, stable and monitoring     reports that she quit smoking about 7 years ago. Her smoking use included cigarettes. She has a 35.00 pack-year smoking history. She has never used smokeless tobacco.      Appropriate preventive services were discussed with this patient, including applicable screening as appropriate for cardiovascular disease, diabetes, osteopenia/osteoporosis, and glaucoma.  As appropriate for age/gender, discussed screening for colorectal cancer, prostate cancer, breast cancer, and cervical cancer. Checklist reviewing preventive services available has been given to the patient.    Reviewed patients plan of care and provided an AVS. The Intermediate Care Plan ( asthma action plan, low back pain action plan, and migraine action plan) for Clarita meets the Care Plan requirement. This Care Plan has been established and reviewed with the Patient.    Counseling Resources:  ATP IV Guidelines  Pooled Cohorts Equation Calculator  Breast Cancer Risk Calculator  FRAX Risk Assessment  ICSI Preventive Guidelines  Dietary Guidelines for Americans, 2010  USDA's MyPlate  ASA Prophylaxis  Lung CA Screening    LANA Wise CNP  [facility]    Identified Health Risks:  "

## 2020-01-27 ENCOUNTER — ANCILLARY PROCEDURE (OUTPATIENT)
Dept: MAMMOGRAPHY | Facility: CLINIC | Age: 70
End: 2020-01-27
Attending: NURSE PRACTITIONER
Payer: COMMERCIAL

## 2020-01-27 DIAGNOSIS — Z12.31 VISIT FOR SCREENING MAMMOGRAM: ICD-10-CM

## 2020-01-27 PROCEDURE — 77067 SCR MAMMO BI INCL CAD: CPT | Mod: TC

## 2020-08-12 ENCOUNTER — OFFICE VISIT (OUTPATIENT)
Dept: FAMILY MEDICINE | Facility: CLINIC | Age: 70
End: 2020-08-12
Payer: COMMERCIAL

## 2020-08-12 VITALS
WEIGHT: 128 LBS | HEART RATE: 80 BPM | DIASTOLIC BLOOD PRESSURE: 82 MMHG | HEIGHT: 64 IN | RESPIRATION RATE: 14 BRPM | SYSTOLIC BLOOD PRESSURE: 138 MMHG | BODY MASS INDEX: 21.85 KG/M2 | TEMPERATURE: 97.3 F | OXYGEN SATURATION: 97 %

## 2020-08-12 DIAGNOSIS — I10 ESSENTIAL HYPERTENSION WITH GOAL BLOOD PRESSURE LESS THAN 140/90: ICD-10-CM

## 2020-08-12 DIAGNOSIS — Z00.01 ENCOUNTER FOR ROUTINE ADULT PHYSICAL EXAM WITH ABNORMAL FINDINGS: Primary | ICD-10-CM

## 2020-08-12 DIAGNOSIS — I10 HTN, GOAL BELOW 140/90: ICD-10-CM

## 2020-08-12 DIAGNOSIS — E78.5 HYPERLIPIDEMIA LDL GOAL <160: ICD-10-CM

## 2020-08-12 PROCEDURE — G0438 PPPS, INITIAL VISIT: HCPCS | Performed by: NURSE PRACTITIONER

## 2020-08-12 PROCEDURE — 99213 OFFICE O/P EST LOW 20 MIN: CPT | Mod: 25 | Performed by: NURSE PRACTITIONER

## 2020-08-12 RX ORDER — METOPROLOL SUCCINATE 50 MG/1
50 TABLET, EXTENDED RELEASE ORAL DAILY
Qty: 90 TABLET | Refills: 3 | Status: SHIPPED | OUTPATIENT
Start: 2020-08-12 | End: 2020-10-05 | Stop reason: DRUGHIGH

## 2020-08-12 ASSESSMENT — ACTIVITIES OF DAILY LIVING (ADL): CURRENT_FUNCTION: NO ASSISTANCE NEEDED

## 2020-08-12 ASSESSMENT — ENCOUNTER SYMPTOMS
CHILLS: 0
HEMATOCHEZIA: 0
DIARRHEA: 0
CONSTIPATION: 0
HEMATURIA: 0
ABDOMINAL PAIN: 0
COUGH: 0
DIZZINESS: 0

## 2020-08-12 ASSESSMENT — PAIN SCALES - GENERAL: PAINLEVEL: NO PAIN (0)

## 2020-08-12 ASSESSMENT — MIFFLIN-ST. JEOR: SCORE: 1091.63

## 2020-08-12 NOTE — PROGRESS NOTES
"SUBJECTIVE:   Clarita Lowry is a 70 year old female who presents for Preventive Visit.  Are you in the first 12 months of your Medicare coverage?  No    Healthy Habits:     In general, how would you rate your overall health?  Good    Frequency of exercise:  None    Do you usually eat at least 4 servings of fruit and vegetables a day, include whole grains    & fiber and avoid regularly eating high fat or \"junk\" foods?  No    Taking medications regularly:  Yes    Medication side effects:  None    Ability to successfully perform activities of daily living:  No assistance needed    Home Safety:  No safety concerns identified    Hearing Impairment:  No hearing concerns    In the past 6 months, have you been bothered by leaking of urine?  No    In general, how would you rate your overall mental or emotional health?  Good      PHQ-2 Total Score: 0    Additional concerns today:  No    Do you feel safe in your environment? No    Have you ever done Advance Care Planning? (For example, a Health Directive, POLST, or a discussion with a medical provider or your loved ones about your wishes): Yes, patient states has an Advance Care Planning document and will bring a copy to the clinic.    Fall risk  Fallen 2 or more times in the past year?: No  Any fall with injury in the past year?: No  click delete button to remove this line now  Cognitive Screening   1) Repeat 3 items (Leader, Season, Table)    2) Clock draw: NORMAL  3) 3 item recall: Recalls 3 objects  Results: 3 items recalled: COGNITIVE IMPAIRMENT LESS LIKELY    Mini-CogTM Copyright JEFRY Gonsales. Licensed by the author for use in [facility]; reprinted with permission ([email or web address]). All rights reserved.      Do you have sleep apnea, excessive snoring or daytime drowsiness?: no    Reviewed and updated as needed this visit by clinical staff  Tobacco  Allergies  Meds         Reviewed and updated as needed this visit by Provider        Social History     Tobacco " Use     Smoking status: Former Smoker     Packs/day: 1.00     Years: 35.00     Pack years: 35.00     Types: Cigarettes     Last attempt to quit: 3/13/2012     Years since quittin.4     Smokeless tobacco: Never Used   Substance Use Topics     Alcohol use: Yes     Comment: few drinks a year     If you drink alcohol do you typically have >3 drinks per day or >7 drinks per week? No    Alcohol Use 2020   Prescreen: >3 drinks/day or >7 drinks/week? No   Prescreen: >3 drinks/day or >7 drinks/week? -   No flowsheet data found.        -------------------------------------    Current providers sharing in care for this patient include:   Patient Care Team:  Claire Amos APRN CNP as PCP - General (Family Practice)  Claire Amos APRN CNP as Assigned PCP    The following health maintenance items are reviewed in Epic and correct as of today:  Health Maintenance   Topic Date Due     ADVANCE CARE PLANNING  1950     ZOSTER IMMUNIZATION (1 of 2) 03/15/2002     PNEUMOCOCCAL IMMUNIZATION 65+ LOW/MEDIUM RISK (1 of 2 - PCV13) 03/15/2017     PHQ-2  2020     MEDICARE ANNUAL WELLNESS VISIT  2020     FALL RISK ASSESSMENT  2020     INFLUENZA VACCINE (1) 2020     MAMMO SCREENING  2022     LIPID  2023     DTAP/TDAP/TD IMMUNIZATION (3 - Td) 2025     COLORECTAL CANCER SCREENING  2025     DEXA  Completed     HEPATITIS C SCREENING  Completed     IPV IMMUNIZATION  Aged Out     MENINGITIS IMMUNIZATION  Aged Out     HEPATITIS B IMMUNIZATION  Aged Out     Lab work is in process  Labs reviewed in EPIC  BP Readings from Last 3 Encounters:   20 138/82   19 (!) 148/70   18 138/80    Wt Readings from Last 3 Encounters:   20 58.1 kg (128 lb)   19 56.7 kg (125 lb)   18 56.2 kg (124 lb)            Mammogram Screening: Mammogram Screening: Patient over age 50, mutual decision to screen reflected in health maintenance.    Review of Systems  "  Constitutional: Negative for chills.   HENT: Negative for congestion.    Respiratory: Negative for cough.    Cardiovascular: Negative for chest pain.   Gastrointestinal: Negative for abdominal pain, constipation, diarrhea and hematochezia.   Genitourinary: Negative for hematuria.   Neurological: Negative for dizziness.       HTN feeling well. No concerns.  Working at [employer] increased stress with COVID   OBJECTIVE:   /82   Pulse 80   Temp 97.3  F (36.3  C) (Tympanic)   Resp 14   Ht 1.619 m (5' 3.75\")   Wt 58.1 kg (128 lb)   SpO2 97%   BMI 22.14 kg/m   Estimated body mass index is 22.14 kg/m  as calculated from the following:    Height as of this encounter: 1.619 m (5' 3.75\").    Weight as of this encounter: 58.1 kg (128 lb).  Physical Exam  GENERAL APPEARANCE: healthy, alert and no distress  EYES: Eyes grossly normal to inspection, PERRL and conjunctivae and sclerae normal  HENT: ear canals and TM's normal, nose and mouth without ulcers or lesions, oropharynx clear and oral mucous membranes moist  NECK: no adenopathy, no asymmetry, masses, or scars and thyroid normal to palpation  RESP: lungs clear to auscultation - no rales, rhonchi or wheezes  BREAST: declined  CV: regular rate and rhythm, normal S1 S2, no S3 or S4, no murmur, click or rub, no peripheral edema and peripheral pulses strong  ABDOMEN: soft, nontender, no hepatosplenomegaly, no masses and bowel sounds normal   (female): declined  MS: no musculoskeletal defects are noted and gait is age appropriate without ataxia  SKIN: no suspicious lesions or rashes  NEURO: Normal strength and tone, sensory exam grossly normal, mentation intact and speech normal  PSYCH: mentation appears normal and affect normal/bright    Diagnostic Test Results:  Labs reviewed in Epic    ASSESSMENT / PLAN:   Clarita was seen today for annual visit.    Diagnoses and all orders for this visit:    Encounter for routine adult physical exam with abnormal " "findings    Essential hypertension with goal blood pressure less than 140/90  -     metoprolol succinate ER (TOPROL-XL) 50 MG 24 hr tablet; Take 1 tablet (50 mg) by mouth daily  -     CBC with platelets and differential; Future  -     Lipid panel reflex to direct LDL Fasting; Future  -     **Comprehensive metabolic panel FUTURE anytime; Future  -     *UA reflex to Microscopic and Culture ([facility] (except [place] and [place]); Future    check BP weekly  DASH diet recommended.   If >140/90 consistently then increase toprol xl to 100 mg daily  Labs ordered.  Call or return to the clinic with any worsening of symptoms or no resolution. Patient/Parent verbalized understanding and is in agreement. Medication side effects reviewed.   Current Outpatient Medications   Medication Sig Dispense Refill     metoprolol succinate ER (TOPROL-XL) 50 MG 24 hr tablet Take 1 tablet (50 mg) by mouth daily 90 tablet 3     aspirin 81 MG tablet Take by mouth daily 30 tablet          COUNSELING:  Reviewed preventive health counseling, as reflected in patient instructions    Estimated body mass index is 22.14 kg/m  as calculated from the following:    Height as of this encounter: 1.619 m (5' 3.75\").    Weight as of this encounter: 58.1 kg (128 lb).    Shingles and pneumococcal declined today     reports that she quit smoking about 8 years ago. Her smoking use included cigarettes. She has a 35.00 pack-year smoking history. She has never used smokeless tobacco.      Appropriate preventive services were discussed with this patient, including applicable screening as appropriate for cardiovascular disease, diabetes, osteopenia/osteoporosis, and glaucoma.  As appropriate for age/gender, discussed screening for colorectal cancer, prostate cancer, breast cancer, and cervical cancer. Checklist reviewing preventive services available has been given to the patient.    Reviewed patients plan of care and provided an AVS. The Basic " Care Plan (routine screening as documented in Health Maintenance) for Clarita meets the Care Plan requirement. This Care Plan has been established and reviewed with the Patient.    Counseling Resources:  ATP IV Guidelines  Pooled Cohorts Equation Calculator  Breast Cancer Risk Calculator  FRAX Risk Assessment  ICSI Preventive Guidelines  Dietary Guidelines for Americans, 2010  USDA's MyPlate  ASA Prophylaxis  Lung CA Screening  See patient instruction    LANA Wise CNP  [facility]    Identified Health Risks: HTN , HYPERLIPDEMIA

## 2020-08-25 DIAGNOSIS — I10 ESSENTIAL HYPERTENSION WITH GOAL BLOOD PRESSURE LESS THAN 140/90: ICD-10-CM

## 2020-08-25 DIAGNOSIS — N39.0 URINARY TRACT INFECTION WITHOUT HEMATURIA, SITE UNSPECIFIED: Primary | ICD-10-CM

## 2020-08-25 LAB
ALBUMIN SERPL-MCNC: 3.7 G/DL (ref 3.4–5)
ALP SERPL-CCNC: 75 U/L (ref 40–150)
ALT SERPL W P-5'-P-CCNC: 22 U/L (ref 0–50)
ANION GAP SERPL CALCULATED.3IONS-SCNC: 2 MMOL/L (ref 3–14)
AST SERPL W P-5'-P-CCNC: 21 U/L (ref 0–45)
BASOPHILS # BLD AUTO: 0 10E9/L (ref 0–0.2)
BASOPHILS NFR BLD AUTO: 0.5 %
BILIRUB SERPL-MCNC: 0.4 MG/DL (ref 0.2–1.3)
BILIRUB UR QL: ABNORMAL
BUN SERPL-MCNC: 10 MG/DL (ref 7–30)
CALCIUM SERPL-MCNC: 9.4 MG/DL (ref 8.5–10.1)
CHLORIDE SERPL-SCNC: 109 MMOL/L (ref 94–109)
CHOLEST SERPL-MCNC: 170 MG/DL
CLARITY: CLEAR
CO2 SERPL-SCNC: 32 MMOL/L (ref 20–32)
COLOR UR: YELLOW
CREAT SERPL-MCNC: 0.77 MG/DL (ref 0.52–1.04)
DIFFERENTIAL METHOD BLD: NORMAL
EOSINOPHIL # BLD AUTO: 0.5 10E9/L (ref 0–0.7)
EOSINOPHIL NFR BLD AUTO: 7.5 %
ERYTHROCYTE [DISTWIDTH] IN BLOOD BY AUTOMATED COUNT: 12.2 % (ref 10–15)
GFR SERPL CREATININE-BSD FRML MDRD: 79 ML/MIN/{1.73_M2}
GLUCOSE SERPL-MCNC: 90 MG/DL (ref 70–99)
GLUCOSE URINE: ABNORMAL MG/DL
HCT VFR BLD AUTO: 36.7 % (ref 35–47)
HDLC SERPL-MCNC: 61 MG/DL
HGB BLD-MCNC: 12.2 G/DL (ref 11.7–15.7)
HGB UR QL: ABNORMAL
KETONES UR QL: ABNORMAL MG/DL
LDLC SERPL CALC-MCNC: 96 MG/DL
LYMPHOCYTES # BLD AUTO: 2.5 10E9/L (ref 0.8–5.3)
LYMPHOCYTES NFR BLD AUTO: 40.6 %
MCH RBC QN AUTO: 30.7 PG (ref 26.5–33)
MCHC RBC AUTO-ENTMCNC: 33.2 G/DL (ref 31.5–36.5)
MCV RBC AUTO: 92 FL (ref 78–100)
MONOCYTES # BLD AUTO: 0.9 10E9/L (ref 0–1.3)
MONOCYTES NFR BLD AUTO: 15.3 %
NEUTROPHILS # BLD AUTO: 2.2 10E9/L (ref 1.6–8.3)
NEUTROPHILS NFR BLD AUTO: 36.1 %
NITRITE UR QL STRIP: ABNORMAL
NONHDLC SERPL-MCNC: 109 MG/DL
PH UR STRIP: 5.5 PH (ref 5–7)
PLATELET # BLD AUTO: 263 10E9/L (ref 150–450)
POTASSIUM SERPL-SCNC: 4.1 MMOL/L (ref 3.4–5.3)
PROT SERPL-MCNC: 7 G/DL (ref 6.8–8.8)
PROT UR QL: ABNORMAL MG/DL
RBC # BLD AUTO: 3.98 10E12/L (ref 3.8–5.2)
SODIUM SERPL-SCNC: 143 MMOL/L (ref 133–144)
SP GR UR STRIP: 1.03 (ref 1–1.03)
SPECIMEN VOL UR: ABNORMAL ML
TRIGL SERPL-MCNC: 65 MG/DL
UROBILINOGEN UR QL STRIP: 0.2 EU/DL (ref 0.2–1)
WBC # BLD AUTO: 6 10E9/L (ref 4–11)
WBC #/AREA URNS HPF: ABNORMAL /[HPF]

## 2020-08-25 PROCEDURE — 85025 COMPLETE CBC W/AUTO DIFF WBC: CPT | Performed by: NURSE PRACTITIONER

## 2020-08-25 PROCEDURE — 87086 URINE CULTURE/COLONY COUNT: CPT | Performed by: NURSE PRACTITIONER

## 2020-08-25 PROCEDURE — 80061 LIPID PANEL: CPT | Performed by: NURSE PRACTITIONER

## 2020-08-25 PROCEDURE — 80053 COMPREHEN METABOLIC PANEL: CPT | Performed by: NURSE PRACTITIONER

## 2020-08-25 PROCEDURE — 36415 COLL VENOUS BLD VENIPUNCTURE: CPT | Performed by: NURSE PRACTITIONER

## 2020-08-25 PROCEDURE — 81003 URINALYSIS AUTO W/O SCOPE: CPT

## 2020-08-26 LAB
BACTERIA SPEC CULT: NO GROWTH
Lab: NORMAL
SPECIMEN SOURCE: NORMAL

## 2020-10-01 ENCOUNTER — TELEPHONE (OUTPATIENT)
Dept: FAMILY MEDICINE | Facility: CLINIC | Age: 70
End: 2020-10-01

## 2020-10-02 NOTE — TELEPHONE ENCOUNTER
Pt said the B/P machine is not working. Pt said she is doing fine. If any issues will call back or come in to have B/P checked.  Meagan [facility] [employer]    
Reason for call:  Patient reporting a symptom    Symptom or request: Pt bent over and felt light headed this am. Pt said at last appt Claire Dandre MURPHY  Discussed increasing her B/P Medication. Pt said she is not sleeping good also.     Phone Number patient can be reached at:  Home number on file [phone]    Best Time:  Any Time      Can we leave a detailed message on this number:  YES    Call taken on 10/1/2020 at 11:01 AM by Meagan Umana    
Spoke to the patient who is reporting occasional lightheadedness especially when she bends over, this resolves, but comes and goes since yesterday,  denies any chest pain or shortness of breath. Denies she feels faint, when standing she is fine.  Patient says she has been stressed out more with work and taking care of loved one. Patient reports she has not been checking her B/P since last seen. Patient works at [employer] and can check B/P there and works 5 days a week.    Advised to patient to monitor symptoms, make position changes slowly, when bending over, get up slowly.     If the lightheadedness worsens or she feels faint or possibly could fall, then call 9-1-1 or seek the ER.    Advised to monitor B/P tonight at work, if greater than 140/90, call back tomorrow, continue to monitor B/P daily and update the clinic on Monday with readings. Will update PCP for further instructions.    CHRIS Brar    
no

## 2020-10-05 ENCOUNTER — OFFICE VISIT (OUTPATIENT)
Dept: FAMILY MEDICINE | Facility: CLINIC | Age: 70
End: 2020-10-05
Payer: COMMERCIAL

## 2020-10-05 ENCOUNTER — TELEPHONE (OUTPATIENT)
Dept: FAMILY MEDICINE | Facility: CLINIC | Age: 70
End: 2020-10-05

## 2020-10-05 VITALS — HEART RATE: 78 BPM | DIASTOLIC BLOOD PRESSURE: 80 MMHG | RESPIRATION RATE: 18 BRPM | SYSTOLIC BLOOD PRESSURE: 162 MMHG

## 2020-10-05 DIAGNOSIS — I10 ESSENTIAL HYPERTENSION WITH GOAL BLOOD PRESSURE LESS THAN 140/90: ICD-10-CM

## 2020-10-05 DIAGNOSIS — Z01.30 BP CHECK: ICD-10-CM

## 2020-10-05 DIAGNOSIS — Z00.01 ENCOUNTER FOR ROUTINE ADULT PHYSICAL EXAM WITH ABNORMAL FINDINGS: Primary | ICD-10-CM

## 2020-10-05 PROCEDURE — 99207 PR NO CHARGE NURSE ONLY: CPT

## 2020-10-05 RX ORDER — METOPROLOL SUCCINATE 100 MG/1
100 TABLET, EXTENDED RELEASE ORAL DAILY
Qty: 30 TABLET | Refills: 0 | Status: SHIPPED | OUTPATIENT
Start: 2020-10-05 | End: 2020-10-26

## 2020-10-05 NOTE — TELEPHONE ENCOUNTER
Clarita Lowry is a 70 year old year old patient who comes in today for a Blood Pressure check because of new medication and ongoing blood pressure monitoring.  Vital Signs as repeated by /80 right arm and 162/80 left arm  Patient is taking medication as prescribed  Patient is tolerating medications well.  Patient is monitoring Blood Pressure at home.  Average readings if yes are 176/74,167/68, 169/87, 155/65, 164/85 and 152/70  Current complaints: fatigue.   in hospital and stress at work.  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   If >140/90 consistently then increase toprol xl to 100 mg daily  Labs ordered.  Call or return to the clinic with any worsening of symptoms or no resolution. Patient/Parent verbalized understanding and is in agreement. Medication side effects reviewed.    Advised to continue to recheck BP at home and clinic BP 2 weeks or call.  Bianca IRAHETAI to Claire Amos

## 2020-10-05 NOTE — PROGRESS NOTES
Clarita Lowry is a 70 year old year old patient who comes in today for a Blood Pressure check because of new medication and ongoing blood pressure monitoring.  Vital Signs as repeated by /80 right arm and 162/80 left arm  Patient is taking medication as prescribed  Patient is tolerating medications well.  Patient is monitoring Blood Pressure at home.  Average readings if yes are 176/74,167/68, 169/87, 155/65, 164/85 and 152/70  Current complaints: fatigue.   in hospital and stress at work.  Is a check out gal at [employer]  Disposition:  Per Claire Amos's note from 8/12/20:    sential hypertension with goal blood pressure less than 140/90  -     metoprolol succinate ER (TOPROL-XL) 50 MG 24 hr tablet; Take 1 tablet (50 mg) by mouth daily  -     CBC with platelets and differential; Future  -     Lipid panel reflex to direct LDL Fasting; Future  -     **Comprehensive metabolic panel FUTURE anytime; Future  -     *UA reflex to Microscopic and Culture ([facility] (except [place] and [place]); Future     check BP weekly  DASH diet recommended.   If >140/90 consistently then increase toprol xl to 100 mg daily  Labs ordered.  Call or return to the clinic with any worsening of symptoms or no resolution. Patient/Parent verbalized understanding and is in agreement. Medication side effects reviewed.    Advised to continue to recheck BP at home and clinic BP 2 weeks or call.  Bianca Jaramillo RN

## 2020-10-06 NOTE — TELEPHONE ENCOUNTER
If her HR is > 50 then could increase her toprol xl to 100 mg daily and recheck BP and HR in 2 weeks  Thanks Claire Amos FNP-BC

## 2020-10-19 ENCOUNTER — TELEPHONE (OUTPATIENT)
Dept: FAMILY MEDICINE | Facility: CLINIC | Age: 70
End: 2020-10-19

## 2020-10-19 DIAGNOSIS — I10 ESSENTIAL HYPERTENSION WITH GOAL BLOOD PRESSURE LESS THAN 140/90: ICD-10-CM

## 2020-10-19 NOTE — TELEPHONE ENCOUNTER
I talked with Clarita and told her Claire Amos is out of office today and she would see this until 10/23/20.   She is taking Toprol   mg daily as directed by Claire on 1/5/20.  States feels really good.  Bianca Jaramillo RN

## 2020-10-19 NOTE — TELEPHONE ENCOUNTER
Reason for Call:  Other patient update    Detailed comments: pt calling to update progress of blood pressure medication. 10//72, 10//75, 10//74, 10//73    Phone Number Patient can be reached at: Home number on file [phone]    Best Time: any    Can we leave a detailed message on this number? YES    Call taken on 10/19/2020 at 9:23 AM by Jessica Alonzo

## 2020-10-26 VITALS — SYSTOLIC BLOOD PRESSURE: 132 MMHG | DIASTOLIC BLOOD PRESSURE: 72 MMHG

## 2020-10-26 RX ORDER — METOPROLOL SUCCINATE 100 MG/1
100 TABLET, EXTENDED RELEASE ORAL DAILY
Qty: 90 TABLET | Refills: 1 | Status: SHIPPED | OUTPATIENT
Start: 2020-10-26 | End: 2021-05-06

## 2021-04-12 ENCOUNTER — ANCILLARY PROCEDURE (OUTPATIENT)
Dept: MAMMOGRAPHY | Facility: CLINIC | Age: 71
End: 2021-04-12
Attending: NURSE PRACTITIONER
Payer: COMMERCIAL

## 2021-04-12 DIAGNOSIS — Z12.31 VISIT FOR SCREENING MAMMOGRAM: ICD-10-CM

## 2021-04-12 PROCEDURE — 77067 SCR MAMMO BI INCL CAD: CPT | Mod: TC | Performed by: RADIOLOGY

## 2021-04-12 PROCEDURE — 77063 BREAST TOMOSYNTHESIS BI: CPT | Mod: TC | Performed by: RADIOLOGY

## 2021-05-06 ENCOUNTER — OFFICE VISIT (OUTPATIENT)
Dept: FAMILY MEDICINE | Facility: CLINIC | Age: 71
End: 2021-05-06
Payer: COMMERCIAL

## 2021-05-06 VITALS
BODY MASS INDEX: 21.62 KG/M2 | WEIGHT: 122 LBS | TEMPERATURE: 98 F | RESPIRATION RATE: 14 BRPM | OXYGEN SATURATION: 99 % | SYSTOLIC BLOOD PRESSURE: 162 MMHG | DIASTOLIC BLOOD PRESSURE: 78 MMHG | HEART RATE: 85 BPM | HEIGHT: 63 IN

## 2021-05-06 DIAGNOSIS — I10 ESSENTIAL HYPERTENSION WITH GOAL BLOOD PRESSURE LESS THAN 140/90: ICD-10-CM

## 2021-05-06 PROCEDURE — 99214 OFFICE O/P EST MOD 30 MIN: CPT | Performed by: NURSE PRACTITIONER

## 2021-05-06 RX ORDER — METOPROLOL SUCCINATE 100 MG/1
100 TABLET, EXTENDED RELEASE ORAL DAILY
Qty: 90 TABLET | Refills: 1 | Status: SHIPPED | OUTPATIENT
Start: 2021-05-06 | End: 2021-11-05

## 2021-05-06 RX ORDER — METOPROLOL SUCCINATE 50 MG/1
50 TABLET, EXTENDED RELEASE ORAL DAILY
Qty: 90 TABLET | Refills: 1 | Status: SHIPPED | OUTPATIENT
Start: 2021-05-06 | End: 2021-11-08

## 2021-05-06 ASSESSMENT — MIFFLIN-ST. JEOR: SCORE: 1047.52

## 2021-05-06 NOTE — PATIENT INSTRUCTIONS
Patient Education     Checking Your Blood Pressure  Step-by-Step    [employer] last reviewed this educational content on 4/1/2020 2000-2021 [employer]. All rights reserved. This information is not intended as a substitute for professional medical care. Always follow your healthcare professional's instructions.           Patient Education     Discharge Instructions for High Blood Pressure (Hypertension)  You have been diagnosed with high blood pressure (hypertension). This means the force of blood against your artery walls is too strong. It also means your heart is working hard to move blood. High blood pressure usually has no symptoms, but over time, it can cause serious health problems. High blood pressure raises your risk for heart attack, stroke, heart disease, heart failure, kidney disease, and vision loss. With help from your doctor, you can manage your blood pressure and protect your health.  Blood pressure measurements are given as 2 numbers. Systolic blood pressure is the upper number. This is the pressure when the heart contracts or pumps. Diastolic blood pressure is the lower number. This is the pressure when the heart relaxes between beats.  Blood pressure is categorized as normal, elevated, or stage 1 or stage 2 high blood pressure:    Normal blood pressure is systolic of less than 120 and diastolic of less than 80 (120/80) at rest    Elevated blood pressure is systolic of 120 to 129 and diastolic less than 80 at rest    Stage 1 high blood pressure is systolic is 130 to 139 or diastolic between 80 to 89 at rest    Stage 2 high blood pressure is when systolic is 140 or higher or the diastolic is 90 or higher at rest  Taking medicine    Learn to measure your own blood pressure. Keep a record of your results. Ask your doctor which readings mean that you need medical attention.    Take your blood pressure medicine exactly as directed. Don t skip doses. Missing doses can cause your blood  pressure to get out of control.    If you do miss a dose (or doses) check with your healthcare provider about what to do.    Don't take medicines that contain heart stimulants, including over-the-counter medicines. Check for warnings about high blood pressure on the label. Ask the pharmacist before purchasing something you haven't used before    Check with your doctor or pharmacist before taking a decongestant such as pseudoephedrine or phenylephrine. Some decongestants can worsen high blood pressure.    Lifestyle changes    Stay at a healthy weight. Get help to lose any extra pounds (kilograms). Often times meeting with a dietitian can help you identify changes that can be made to your diet to help with weight loss.    Cut back on salt.  ? Limit canned, dried, packaged, and fast foods.  ? Don t add salt to your food at the table.  ? Season foods with herbs instead of salt when you cook.  ? Request no added salt when you go to a restaurant.  ? The American Heart Association (AHA) says the ideal amount of sodium is no more than 1,500 mg a day.  But because Americans eat so much salt, you can make a positive change by cutting back to even 2,300 mg of sodium a day (1 teaspoon).     Follow the DASH (Dietary Approaches to Stop Hypertension) eating plan. This plan recommends vegetables, fruits, whole gains, and other heart healthy foods.    Eat food rich in potassium.    Begin an exercise program. Ask your healthcare provider how to get started. The AHA recommends aerobic exercise 3 to 4 times a week for an average of 40 minutes at a time to lower blood pressure, with your provider's approval. Simple activities such as walking or gardening can help.    Break the smoking habit. Enroll in a stop-smoking program to improve your chances of success. Ask your healthcare provider about programs and medicines to help you stop smoking.    Limit drinks that contain caffeine such as coffee, black or green tea, and cola to 2 per  day.    Never take stimulants such as amphetamines or cocaine. These drugs can be deadly for someone with high blood pressure.    Control your stress. Learn ways to manage stress.    Limit alcohol to no more than 1 drink a day for women and 2 drinks a day for men.    Follow-up care  Make a follow-up appointment as directed.  When to call your healthcare provider  Call your healthcare provider right away if you have any of the following:    Chest pain or shortness of breath ( call 911)    Moderate to severe headache    Weakness in the muscles of your face, arms, or legs    Trouble speaking    Extreme drowsiness    Confusion    Fainting or dizziness    Pulsating or rushing sound in your ears    Unexplained nosebleed    Weakness, tingling, or numbness of your face, arms, or legs    Change in vision    Blood pressure measured at home that is greater than 180/110  Janee last reviewed this educational content on 8/1/2019 2000-2021 [employer]. All rights reserved. This information is not intended as a substitute for professional medical care. Always follow your healthcare professional's instructions.

## 2021-05-06 NOTE — PROGRESS NOTES
"    Assessment & Plan     Essential hypertension with goal blood pressure less than 140/90  Elevated BP  - metoprolol succinate ER (TOPROL-XL) 100 MG 24 hr tablet; Take 1 tablet (100 mg) by mouth daily  - metoprolol succinate ER (TOPROL-XL) 50 MG 24 hr tablet; Take 1 tablet (50 mg) by mouth daily Add to 100 mg you are taking now  - **CBC with platelets differential FUTURE 2mo; Future  - Lipid panel reflex to direct LDL Fasting; Future  - **Basic metabolic panel FUTURE anytime; Future      Return in about 1 month (around 6/6/2021) for HTN.    LANA Wise CNP  M [facility]    Estefania Otto is a 71 year old who presents for the following health issues     HPI     Hypertension Follow-up      Do you check your blood pressure regularly outside of the clinic? Yes     Home readings     144/74    144/73    Are you following a low salt diet? No    Are your blood pressures ever more than 140 on the top number (systolic) OR more   than 90 on the bottom number (diastolic), for example 140/90? Yes     Creatinine   Date Value Ref Range Status   08/25/2020 0.77 0.52 - 1.04 mg/dL Final       Review of Systems   Constitutional, HEENT, cardiovascular, pulmonary, GI, , musculoskeletal, neuro, skin, endocrine and psych systems are negative, except as otherwise noted.      Objective    BP (!) 162/78   Pulse 85   Temp 98  F (36.7  C) (Tympanic)   Resp 14   Ht 1.6 m (5' 3\")   Wt 55.3 kg (122 lb)   SpO2 99%   BMI 21.61 kg/m    Body mass index is 21.61 kg/m .  Physical Exam   GENERAL: healthy, alert and no distress  EYES: Eyes grossly normal to inspection, PERRL and conjunctivae and sclerae normal  HENT: ear canals and TM's normal, nose and mouth without ulcers or lesions  NECK: no adenopathy, no asymmetry, masses, or scars and thyroid normal to palpation  RESP: lungs clear to auscultation - no rales, rhonchi or wheezes  CV: regular rate and rhythm, normal S1 S2, no S3 or S4, no murmur, " click or rub, no peripheral edema and peripheral pulses strong  ABDOMEN: soft, nontender, no hepatosplenomegaly, no masses and bowel sounds normal  MS: no gross musculoskeletal defects noted, no edema  SKIN: no suspicious lesions or rashes  NEURO: Normal strength and tone, mentation intact and speech normal  PSYCH: mentation appears normal, affect normal/bright    Orders Only on 08/25/2020   Component Date Value Ref Range Status     Sodium 08/25/2020 143  133 - 144 mmol/L Final     Potassium 08/25/2020 4.1  3.4 - 5.3 mmol/L Final     Chloride 08/25/2020 109  94 - 109 mmol/L Final     Carbon Dioxide 08/25/2020 32  20 - 32 mmol/L Final     Anion Gap 08/25/2020 2* 3 - 14 mmol/L Final     Glucose 08/25/2020 90  70 - 99 mg/dL Final     Urea Nitrogen 08/25/2020 10  7 - 30 mg/dL Final     Creatinine 08/25/2020 0.77  0.52 - 1.04 mg/dL Final     GFR Estimate 08/25/2020 79  >60 mL/min/[1.73_m2] Final    Comment: Non  GFR Calc  Starting 12/18/2018, serum creatinine based estimated GFR (eGFR) will be   calculated using the Chronic Kidney Disease Epidemiology Collaboration   (CKD-EPI) equation.       GFR Estimate If Black 08/25/2020 >90  >60 mL/min/[1.73_m2] Final    Comment:  GFR Calc  Starting 12/18/2018, serum creatinine based estimated GFR (eGFR) will be   calculated using the Chronic Kidney Disease Epidemiology Collaboration   (CKD-EPI) equation.       Calcium 08/25/2020 9.4  8.5 - 10.1 mg/dL Final     Bilirubin Total 08/25/2020 0.4  0.2 - 1.3 mg/dL Final     Albumin 08/25/2020 3.7  3.4 - 5.0 g/dL Final     Protein Total 08/25/2020 7.0  6.8 - 8.8 g/dL Final     Alkaline Phosphatase 08/25/2020 75  40 - 150 U/L Final     ALT 08/25/2020 22  0 - 50 U/L Final     AST 08/25/2020 21  0 - 45 U/L Final     Cholesterol 08/25/2020 170  <200 mg/dL Final     Triglycerides 08/25/2020 65  <150 mg/dL Final     HDL Cholesterol 08/25/2020 61  >49 mg/dL Final     LDL Cholesterol Calculated 08/25/2020 96  <100  mg/dL Final    Desirable:       <100 mg/dl     Non HDL Cholesterol 08/25/2020 109  <130 mg/dL Final     WBC 08/25/2020 6.0  4.0 - 11.0 10e9/L Final     RBC Count 08/25/2020 3.98  3.8 - 5.2 10e12/L Final     Hemoglobin 08/25/2020 12.2  11.7 - 15.7 g/dL Final     Hematocrit 08/25/2020 36.7  35.0 - 47.0 % Final     MCV 08/25/2020 92  78 - 100 fl Final     MCH 08/25/2020 30.7  26.5 - 33.0 pg Final     MCHC 08/25/2020 33.2  31.5 - 36.5 g/dL Final     RDW 08/25/2020 12.2  10.0 - 15.0 % Final     Platelet Count 08/25/2020 263  150 - 450 10e9/L Final     % Neutrophils 08/25/2020 36.1  % Final     % Lymphocytes 08/25/2020 40.6  % Final     % Monocytes 08/25/2020 15.3  % Final     % Eosinophils 08/25/2020 7.5  % Final     % Basophils 08/25/2020 0.5  % Final     Absolute Neutrophil 08/25/2020 2.2  1.6 - 8.3 10e9/L Final     Absolute Lymphocytes 08/25/2020 2.5  0.8 - 5.3 10e9/L Final     Absolute Monocytes 08/25/2020 0.9  0.0 - 1.3 10e9/L Final     Absolute Eosinophils 08/25/2020 0.5  0.0 - 0.7 10e9/L Final     Absolute Basophils 08/25/2020 0.0  0.0 - 0.2 10e9/L Final     Diff Method 08/25/2020 Automated Method   Final     Glucose Urine 08/25/2020 Neg  neg mg/dL Final     Bilirubin Urine 08/25/2020 Neg  neg Final     Ketones Urine 08/25/2020 Neg  neg mg/dL Final     Specific Gravity Urine 08/25/2020 1.030  1.003 - 1.035 Final     pH Urine 08/25/2020 5.5  5.0 - 7.0 pH Final     Protein Urine 08/25/2020 neg  neg - neg mg/dL Final     Urobilinogen Urine 08/25/2020 0.2  0.2 - 1.0 EU/dL Final     Nitrite Urine 08/25/2020 Neg  NEG Final     Blood Urine 08/25/2020 Neg  neg Final     Leukocytes 08/25/2020 tr   Final     Color Urine 08/25/2020 Yellow   Final     Clarity 08/25/2020 clear   Final     Volume 08/25/2020 na   Final     Specimen Description 08/25/2020 Midstream Urine   Final     Special Requests 08/25/2020 Specimen received in preservative   Final     Culture Micro 08/25/2020 No growth   Final

## 2021-11-03 DIAGNOSIS — I10 ESSENTIAL HYPERTENSION WITH GOAL BLOOD PRESSURE LESS THAN 140/90: ICD-10-CM

## 2021-11-05 RX ORDER — METOPROLOL SUCCINATE 100 MG/1
TABLET, EXTENDED RELEASE ORAL
Qty: 90 TABLET | Refills: 0 | Status: SHIPPED | OUTPATIENT
Start: 2021-11-05 | End: 2022-02-07

## 2021-11-05 NOTE — TELEPHONE ENCOUNTER
Routing refill request to provider for review/approval because:  B/P not at goal.    05/06/21 (!) 162/78   10/19/20 132/72   10/05/20 (!) 162/80           CHRIS Brar

## 2021-11-06 DIAGNOSIS — I10 ESSENTIAL HYPERTENSION WITH GOAL BLOOD PRESSURE LESS THAN 140/90: ICD-10-CM

## 2021-11-08 ENCOUNTER — TELEPHONE (OUTPATIENT)
Dept: FAMILY MEDICINE | Facility: CLINIC | Age: 71
End: 2021-11-08
Payer: COMMERCIAL

## 2021-11-08 RX ORDER — METOPROLOL SUCCINATE 50 MG/1
TABLET, EXTENDED RELEASE ORAL
Qty: 90 TABLET | Refills: 0 | Status: SHIPPED | OUTPATIENT
Start: 2021-11-08 | End: 2022-02-07

## 2021-11-08 NOTE — TELEPHONE ENCOUNTER
Reason for Call:  Prescription    Detailed comments: Pharmacy faxed that patient states she is to be taking metoprolol 150 mg daily; 100 mg was refilled but not 50 mg.  Please clarify.    Call taken on 11/8/2021 at 11:44 AM by Yesenia Faulkner

## 2022-01-21 ENCOUNTER — TELEPHONE (OUTPATIENT)
Dept: FAMILY MEDICINE | Facility: CLINIC | Age: 72
End: 2022-01-21
Payer: COMMERCIAL

## 2022-01-21 NOTE — TELEPHONE ENCOUNTER
"S-(situation): Patient calling to report a symptom    B-(background): she thought she did something to her back last week while shoveling. Her  thinks she had a mini stroke.    A-(assessment): Has had right thumb numbness and right hand is weaker than left since shoveling. Denies ever having change in cognition, drooping face, slurred speech or any other weakness. She has been \"working her hand\" to strengthen it.    R-(recommendations): Assisted to schedule clinic appt.with PCP for 01/28/22. Advised to be seen in [place] is any of the above symptoms reoccur.   Mary SAPP RN      "

## 2022-01-28 ENCOUNTER — OFFICE VISIT (OUTPATIENT)
Dept: FAMILY MEDICINE | Facility: CLINIC | Age: 72
End: 2022-01-28
Payer: COMMERCIAL

## 2022-01-28 ENCOUNTER — ANCILLARY PROCEDURE (OUTPATIENT)
Dept: GENERAL RADIOLOGY | Facility: CLINIC | Age: 72
End: 2022-01-28
Attending: NURSE PRACTITIONER
Payer: COMMERCIAL

## 2022-01-28 ENCOUNTER — TELEPHONE (OUTPATIENT)
Dept: FAMILY MEDICINE | Facility: CLINIC | Age: 72
End: 2022-01-28

## 2022-01-28 VITALS
WEIGHT: 115.2 LBS | SYSTOLIC BLOOD PRESSURE: 134 MMHG | TEMPERATURE: 98.4 F | HEART RATE: 79 BPM | BODY MASS INDEX: 20.41 KG/M2 | DIASTOLIC BLOOD PRESSURE: 72 MMHG | HEIGHT: 63 IN | RESPIRATION RATE: 20 BRPM | OXYGEN SATURATION: 98 %

## 2022-01-28 DIAGNOSIS — R20.2 NUMBNESS AND TINGLING IN RIGHT HAND: ICD-10-CM

## 2022-01-28 DIAGNOSIS — I10 ESSENTIAL HYPERTENSION WITH GOAL BLOOD PRESSURE LESS THAN 140/90: ICD-10-CM

## 2022-01-28 DIAGNOSIS — R20.0 NUMBNESS AND TINGLING IN RIGHT HAND: Primary | ICD-10-CM

## 2022-01-28 DIAGNOSIS — R20.0 NUMBNESS AND TINGLING IN RIGHT HAND: ICD-10-CM

## 2022-01-28 DIAGNOSIS — I65.23 CAROTID ARTERY CALCIFICATION, BILATERAL: ICD-10-CM

## 2022-01-28 DIAGNOSIS — R20.2 NUMBNESS AND TINGLING IN RIGHT HAND: Primary | ICD-10-CM

## 2022-01-28 LAB
ALBUMIN SERPL-MCNC: 4 G/DL (ref 3.4–5)
ALP SERPL-CCNC: 76 U/L (ref 40–150)
ALT SERPL W P-5'-P-CCNC: 24 U/L (ref 0–50)
ANION GAP SERPL CALCULATED.3IONS-SCNC: 4 MMOL/L (ref 3–14)
AST SERPL W P-5'-P-CCNC: 26 U/L (ref 0–45)
BASOPHILS # BLD MANUAL: 0 10E3/UL (ref 0–0.2)
BASOPHILS NFR BLD MANUAL: 0 %
BILIRUB SERPL-MCNC: 0.5 MG/DL (ref 0.2–1.3)
BUN SERPL-MCNC: 18 MG/DL (ref 7–30)
CALCIUM SERPL-MCNC: 9 MG/DL (ref 8.5–10.1)
CHLORIDE BLD-SCNC: 104 MMOL/L (ref 94–109)
CHOLEST SERPL-MCNC: 159 MG/DL
CO2 SERPL-SCNC: 29 MMOL/L (ref 20–32)
CREAT SERPL-MCNC: 0.98 MG/DL (ref 0.52–1.04)
EOSINOPHIL # BLD MANUAL: 0.1 10E3/UL (ref 0–0.7)
EOSINOPHIL NFR BLD MANUAL: 1 %
ERYTHROCYTE [DISTWIDTH] IN BLOOD BY AUTOMATED COUNT: 11.8 % (ref 10–15)
FASTING STATUS PATIENT QL REPORTED: YES
GFR SERPL CREATININE-BSD FRML MDRD: 62 ML/MIN/1.73M2
GLUCOSE BLD-MCNC: 92 MG/DL (ref 70–99)
HCT VFR BLD AUTO: 41.6 % (ref 35–47)
HDLC SERPL-MCNC: 70 MG/DL
HGB BLD-MCNC: 13.7 G/DL (ref 11.7–15.7)
LDLC SERPL CALC-MCNC: 74 MG/DL
LYMPHOCYTES # BLD MANUAL: 1.3 10E3/UL (ref 0.8–5.3)
LYMPHOCYTES NFR BLD MANUAL: 20 %
MCH RBC QN AUTO: 31.6 PG (ref 26.5–33)
MCHC RBC AUTO-ENTMCNC: 32.9 G/DL (ref 31.5–36.5)
MCV RBC AUTO: 96 FL (ref 78–100)
MONOCYTES # BLD MANUAL: 1.3 10E3/UL (ref 0–1.3)
MONOCYTES NFR BLD MANUAL: 20 %
NEUTROPHILS # BLD MANUAL: 4 10E3/UL (ref 1.6–8.3)
NEUTROPHILS NFR BLD MANUAL: 59 %
NONHDLC SERPL-MCNC: 89 MG/DL
PLAT MORPH BLD: NORMAL
PLATELET # BLD AUTO: 229 10E3/UL (ref 150–450)
POTASSIUM BLD-SCNC: 4.3 MMOL/L (ref 3.4–5.3)
PROT SERPL-MCNC: 7.8 G/DL (ref 6.8–8.8)
RBC # BLD AUTO: 4.33 10E6/UL (ref 3.8–5.2)
RBC MORPH BLD: NORMAL
SODIUM SERPL-SCNC: 137 MMOL/L (ref 133–144)
TRIGL SERPL-MCNC: 73 MG/DL
VIT B12 SERPL-MCNC: 463 PG/ML (ref 193–986)
WBC # BLD AUTO: 6.7 10E3/UL (ref 4–11)

## 2022-01-28 PROCEDURE — 85027 COMPLETE CBC AUTOMATED: CPT | Performed by: NURSE PRACTITIONER

## 2022-01-28 PROCEDURE — 36415 COLL VENOUS BLD VENIPUNCTURE: CPT | Performed by: NURSE PRACTITIONER

## 2022-01-28 PROCEDURE — 80061 LIPID PANEL: CPT | Performed by: NURSE PRACTITIONER

## 2022-01-28 PROCEDURE — 82607 VITAMIN B-12: CPT | Performed by: NURSE PRACTITIONER

## 2022-01-28 PROCEDURE — 80053 COMPREHEN METABOLIC PANEL: CPT | Performed by: NURSE PRACTITIONER

## 2022-01-28 PROCEDURE — 72040 X-RAY EXAM NECK SPINE 2-3 VW: CPT | Performed by: RADIOLOGY

## 2022-01-28 PROCEDURE — 99214 OFFICE O/P EST MOD 30 MIN: CPT | Performed by: NURSE PRACTITIONER

## 2022-01-28 ASSESSMENT — MIFFLIN-ST. JEOR: SCORE: 1016.67

## 2022-01-28 NOTE — PATIENT INSTRUCTIONS
"  Patient Education     Paraesthesias  Paraesthesia is a burning or prickling sensation that is sometimes felt in the hands, arms, legs or feet. It can also occur in other parts of the body. It can also feel like tingling or numbness, skin crawling, or itching. The feeling is not comfortable, but it is not painful. (The \"pins and needles\" feeling that happens when a foot or hand \"falls asleep\" is a temporary paraesthesia.)  Paraesthesias that last or come and go may be caused by medical issues that need to be treated. These include stroke, a bulging disk pressing on a nerve, a trapped nerve, vitamin deficiencies, uncontrolled diabetes, alcohol abuse, or even certain medicines.  Tests are often done. These tests may include blood tests, X-ray, CT (computerized tomography) scan, nerve conduction studies (NCS), or a muscle test (electromyography). Depending on the cause, treatment may include physical therapy.  Home care    Tell your healthcare provider about all medicines you take. This includes prescription and over-the-counter medicines, vitamins, and herbs. Ask if any of the medicines may be causing your problems. Don't make any changes to prescription medicines without talking to your healthcare provider first.    You may be prescribed medicines to help relieve the tingling feeling or for pain. Take all medicines as directed.    A numb hand or foot may be more prone to injury. To help protect it:  ? Always use oven mitts.  ? Test water with an unaffected hand or foot.  ? Use caution when trimming nails. File sharp areas.  ? Wear shoes that fit well to avoid pressure points, blisters, and ulcers.  ? Inspect your hands and feet carefully (including the soles of your feet and between your toes) daily. If you see red areas, sores, or other problems, tell your healthcare provider.  Follow-up care  Follow up with your doctor, or as advised. You may need further testing or evaluation.     When to seek medical " advice  Call your healthcare provider right away if any of the following occur:    Numbness or weakness of the face, one arm, or one leg    Slurred speech, confusion, trouble speaking, walking, or seeing    Severe headache, fainting spell, dizziness, or seizure    Chest, arm, neck, or upper back pain    Loss of bladder or bowel control    Open wound with redness, swelling, or pus     Janee last reviewed this educational content on 4/1/2018 2000-2021 [employer]. All rights reserved. This information is not intended as a substitute for professional medical care. Always follow your healthcare professional's instructions.

## 2022-01-28 NOTE — PROGRESS NOTES
Assessment & Plan     Numbness and tingling in right hand  - CT Head w/o Contrast  - XR Cervical Spine 2/3 Views  - CBC with platelets and differential  - Comprehensive metabolic panel (BMP + Alb, Alk Phos, ALT, AST, Total. Bili, TP)  - Vitamin B12    Continue ASA 81 mg daily  Continue Toprol xl 150 mg daily   Labs, xray today  Will call with results  CT Head to call [place] [facility] to schedule to rule out infact     Essential hypertension with goal blood pressure less than 140/90  Meeting goal  Continue current meds  Labs done today.   - Lipid panel reflex to direct LDL Fasting      Call or return to the clinic with any worsening of symptoms or no resolution. Patient/Parent verbalized understanding and is in agreement. Medication side effects reviewed.   Current Outpatient Medications   Medication Sig Dispense Refill     aspirin 81 MG tablet Take by mouth daily 30 tablet      Cholecalciferol (D3 ADULT PO)        metoprolol succinate ER (TOPROL-XL) 100 MG 24 hr tablet Take 1 tablet by mouth once daily 90 tablet 0     metoprolol succinate ER (TOPROL-XL) 50 MG 24 hr tablet TAKE 1 TABLET BY MOUTH ONCE DAILY ADD  TO  100  MG   YOU  ARE  TAKING  NOW 90 tablet 0     MULTIPLE VITAMIN PO        Chart documentation with Dragon Voice recognition Software. Although reviewed after completion, some words and grammatical errors may remain.    LANA Wise CNP  [facility]    Estefania Otto is a 71 year old who presents for the following health issues     HPI     Patient states that she cannot close her hand as well on her right hand versus her left hand.    This has been ongoing for a few weeks.  She was shoveling the day she noticed it, and she feels she was totally unable to use that hand that night when it first occurred. Is having mild tenderness on the right forearm.  Her and her  believe she may have had a stroke.  She does not have any other symptoms and did not have  "any other symptoms at that time a few weeks ago.   Patient does feel it is slowly getting better.      No headache   No speech problems  Numbness in the right forearm and right thumb  No strength but is getting better.   Started after shoveling snow  Strength was poor    Neck pain none.  Ongoing upper back pain  Vision changes .. some new glasses to wear has not been helpful    Family history of stroke- Sister had a stroke when she was 27       has no past medical history on file.  Non smoker  Past Surgical History:   Procedure Laterality Date     COLONOSCOPY N/A 9/1/2015    Procedure: COLONOSCOPY;  Surgeon: Mayelin Suresh MD;  Location: [facility]     SURGICAL HISTORY OF -       cyst removal from left knee     SURGICAL HISTORY OF -       cryotherapy and laparoscopy - unclear reason why     TUBAL LIGATION  1982    Laparoscopic bilateral tubal ligation      No Known Allergies      Review of Systems   Constitutional, HEENT, cardiovascular, pulmonary, GI, , musculoskeletal, neuro, skin, endocrine and psych systems are negative, except as otherwise noted.      Objective    /72 (BP Location: Right arm, Patient Position: Sitting, Cuff Size: Adult Regular)   Pulse 79   Temp 98.4  F (36.9  C) (Tympanic)   Resp 20   Ht 1.6 m (5' 3\")   Wt 52.3 kg (115 lb 3.2 oz)   LMP  (LMP Unknown)   SpO2 98%   Breastfeeding No   BMI 20.41 kg/m    Body mass index is 20.41 kg/m .  Physical Exam   GENERAL: healthy, alert and no distress  EYES: Eyes grossly normal to inspection, PERRL and conjunctivae and sclerae normal  HENT: ear canals and TM's normal, nose and mouth without ulcers or lesions  NECK: no adenopathy, no asymmetry, masses, or scars and thyroid normal to palpation  RESP: lungs clear to auscultation - no rales, rhonchi or wheezes  BREAST: normal without masses, tenderness or nipple discharge and no palpable axillary masses or adenopathy  CV: regular rate and rhythm, normal S1 S2, no S3 or S4, no murmur, " click or rub, no peripheral edema and peripheral pulses strong  ABDOMEN: soft, nontender, no hepatosplenomegaly, no masses and bowel sounds normal  MS: no gross musculoskeletal defects noted, no edema  SKIN: no suspicious lesions or rashes  NEURO: weakness of right hand with numbness thumb and right forearm improving over last 2 weeks. , sensory exam grossly normal and mentation intact  PSYCH: mentation appears normal, affect normal/bright    Orders Only on 08/25/2020   Component Date Value Ref Range Status     Sodium 08/25/2020 143  133 - 144 mmol/L Final     Potassium 08/25/2020 4.1  3.4 - 5.3 mmol/L Final     Chloride 08/25/2020 109  94 - 109 mmol/L Final     Carbon Dioxide 08/25/2020 32  20 - 32 mmol/L Final     Anion Gap 08/25/2020 2* 3 - 14 mmol/L Final     Glucose 08/25/2020 90  70 - 99 mg/dL Final     Urea Nitrogen 08/25/2020 10  7 - 30 mg/dL Final     Creatinine 08/25/2020 0.77  0.52 - 1.04 mg/dL Final     GFR Estimate 08/25/2020 79  >60 mL/min/[1.73_m2] Final    Comment: Non  GFR Calc  Starting 12/18/2018, serum creatinine based estimated GFR (eGFR) will be   calculated using the Chronic Kidney Disease Epidemiology Collaboration   (CKD-EPI) equation.       GFR Estimate If Black 08/25/2020 >90  >60 mL/min/[1.73_m2] Final    Comment:  GFR Calc  Starting 12/18/2018, serum creatinine based estimated GFR (eGFR) will be   calculated using the Chronic Kidney Disease Epidemiology Collaboration   (CKD-EPI) equation.       Calcium 08/25/2020 9.4  8.5 - 10.1 mg/dL Final     Bilirubin Total 08/25/2020 0.4  0.2 - 1.3 mg/dL Final     Albumin 08/25/2020 3.7  3.4 - 5.0 g/dL Final     Protein Total 08/25/2020 7.0  6.8 - 8.8 g/dL Final     Alkaline Phosphatase 08/25/2020 75  40 - 150 U/L Final     ALT 08/25/2020 22  0 - 50 U/L Final     AST 08/25/2020 21  0 - 45 U/L Final     Cholesterol 08/25/2020 170  <200 mg/dL Final     Triglycerides 08/25/2020 65  <150 mg/dL Final     HDL Cholesterol  08/25/2020 61  >49 mg/dL Final     LDL Cholesterol Calculated 08/25/2020 96  <100 mg/dL Final    Desirable:       <100 mg/dl     Non HDL Cholesterol 08/25/2020 109  <130 mg/dL Final     WBC 08/25/2020 6.0  4.0 - 11.0 10e9/L Final     RBC Count 08/25/2020 3.98  3.8 - 5.2 10e12/L Final     Hemoglobin 08/25/2020 12.2  11.7 - 15.7 g/dL Final     Hematocrit 08/25/2020 36.7  35.0 - 47.0 % Final     MCV 08/25/2020 92  78 - 100 fl Final     MCH 08/25/2020 30.7  26.5 - 33.0 pg Final     MCHC 08/25/2020 33.2  31.5 - 36.5 g/dL Final     RDW 08/25/2020 12.2  10.0 - 15.0 % Final     Platelet Count 08/25/2020 263  150 - 450 10e9/L Final     % Neutrophils 08/25/2020 36.1  % Final     % Lymphocytes 08/25/2020 40.6  % Final     % Monocytes 08/25/2020 15.3  % Final     % Eosinophils 08/25/2020 7.5  % Final     % Basophils 08/25/2020 0.5  % Final     Absolute Neutrophil 08/25/2020 2.2  1.6 - 8.3 10e9/L Final     Absolute Lymphocytes 08/25/2020 2.5  0.8 - 5.3 10e9/L Final     Absolute Monocytes 08/25/2020 0.9  0.0 - 1.3 10e9/L Final     Absolute Eosinophils 08/25/2020 0.5  0.0 - 0.7 10e9/L Final     Absolute Basophils 08/25/2020 0.0  0.0 - 0.2 10e9/L Final     Diff Method 08/25/2020 Automated Method   Final     Glucose Urine 08/25/2020 Neg  neg mg/dL Final     Bilirubin Urine 08/25/2020 Neg  neg Final     Ketones Urine 08/25/2020 Neg  neg mg/dL Final     Specific Gravity Urine 08/25/2020 1.030  1.003 - 1.035 Final     pH Urine 08/25/2020 5.5  5.0 - 7.0 pH Final     Protein Urine 08/25/2020 neg  neg - neg mg/dL Final     Urobilinogen Urine 08/25/2020 0.2  0.2 - 1.0 EU/dL Final     Nitrite Urine 08/25/2020 Neg  NEG Final     Blood Urine 08/25/2020 Neg  neg Final     Leukocytes 08/25/2020 tr   Final     Color Urine 08/25/2020 Yellow   Final     Clarity 08/25/2020 clear   Final     Volume 08/25/2020 na   Final     Specimen Description 08/25/2020 Midstream Urine   Final     Special Requests 08/25/2020 Specimen received in preservative    Final     Culture Micro 08/25/2020 No growth   Final     Results for orders placed or performed in visit on 01/28/22 (from the past 24 hour(s))   CBC with platelets and differential    Narrative    The following orders were created for panel order CBC with platelets and differential.  Procedure                               Abnormality         Status                     ---------                               -----------         ------                     CBC with platelets and d...[[id number]]                      In process                   Please view results for these tests on the individual orders.

## 2022-01-28 NOTE — TELEPHONE ENCOUNTER
Reason for Call:  Other     Detailed comments: Pt is wondering if she will need an appointment before she can get her next refill on her metoprolol medication. She is not out of the medication yet, just wanting to know if she'll need to set up another appointment.     Phone Number Patient can be reached at: Home number on file [phone] (home)    Best Time: anytime    Can we leave a detailed message on this number? YES    Call taken on 1/28/2022 at 1:10 PM by Juanita Mcguire

## 2022-01-28 NOTE — LETTER
January 31, 2022      Clarita REDDY Essence  [address] [place] [id number]        Dear ,    We are writing to inform you of your test results.    Normal labs       Resulted Orders   Comprehensive metabolic panel (BMP + Alb, Alk Phos, ALT, AST, Total. Bili, TP)   Result Value Ref Range    Sodium 137 133 - 144 mmol/L    Potassium 4.3 3.4 - 5.3 mmol/L    Chloride 104 94 - 109 mmol/L    Carbon Dioxide (CO2) 29 20 - 32 mmol/L    Anion Gap 4 3 - 14 mmol/L    Urea Nitrogen 18 7 - 30 mg/dL    Creatinine 0.98 0.52 - 1.04 mg/dL    Calcium 9.0 8.5 - 10.1 mg/dL    Glucose 92 70 - 99 mg/dL    Alkaline Phosphatase 76 40 - 150 U/L    AST 26 0 - 45 U/L    ALT 24 0 - 50 U/L    Protein Total 7.8 6.8 - 8.8 g/dL    Albumin 4.0 3.4 - 5.0 g/dL    Bilirubin Total 0.5 0.2 - 1.3 mg/dL    GFR Estimate 62 >60 mL/min/1.73m2      Comment:      Effective December 21, 2021 eGFRcr in adults is calculated using the 2021 CKD-EPI creatinine equation which includes age and gender (Matthew et al., NE, DOI: 10.1056/FEVChw5929068)   Vitamin B12   Result Value Ref Range    Vitamin B12 463 193 - 986 pg/mL   Lipid panel reflex to direct LDL Fasting   Result Value Ref Range    Cholesterol 159 <200 mg/dL    Triglycerides 73 <150 mg/dL    Direct Measure HDL 70 >=50 mg/dL    LDL Cholesterol Calculated 74 <=100 mg/dL    Non HDL Cholesterol 89 <130 mg/dL    Patient Fasting > 8hrs? Yes     Narrative    Cholesterol  Desirable:  <200 mg/dL    Triglycerides  Normal:  Less than 150 mg/dL  Borderline High:  150-199 mg/dL  High:  200-499 mg/dL  Very High:  Greater than or equal to 500 mg/dL    Direct Measure HDL  Female:  Greater than or equal to 50 mg/dL   Male:  Greater than or equal to 40 mg/dL    LDL Cholesterol  Desirable:  <100mg/dL  Above Desirable:  100-129 mg/dL   Borderline High:  130-159 mg/dL   High:  160-189 mg/dL   Very High:  >= 190 mg/dL    Non HDL Cholesterol  Desirable:  130 mg/dL  Above Desirable:  130-159 mg/dL  Borderline High:   160-189 mg/dL  High:  190-219 mg/dL  Very High:  Greater than or equal to 220 mg/dL   CBC with platelets and differential   Result Value Ref Range    WBC Count 6.7 4.0 - 11.0 10e3/uL    RBC Count 4.33 3.80 - 5.20 10e6/uL    Hemoglobin 13.7 11.7 - 15.7 g/dL    Hematocrit 41.6 35.0 - 47.0 %    MCV 96 78 - 100 fL    MCH 31.6 26.5 - 33.0 pg    MCHC 32.9 31.5 - 36.5 g/dL    RDW 11.8 10.0 - 15.0 %    Platelet Count 229 150 - 450 10e3/uL   Manual Differential   Result Value Ref Range    % Neutrophils 59 %    % Lymphocytes 20 %    % Monocytes 20 %    % Eosinophils 1 %    % Basophils 0 %    Absolute Neutrophils 4.0 1.6 - 8.3 10e3/uL    Absolute Lymphocytes 1.3 0.8 - 5.3 10e3/uL    Absolute Monocytes 1.3 0.0 - 1.3 10e3/uL    Absolute Eosinophils 0.1 0.0 - 0.7 10e3/uL    Absolute Basophils 0.0 0.0 - 0.2 10e3/uL    RBC Morphology Confirmed RBC Indices     Platelet Assessment  Automated Count Confirmed. Platelet morphology is normal.     Automated Count Confirmed. Platelet morphology is normal.       If you have any questions or concerns, please call the clinic at the number listed above.       Sincerely,      LANA Wise CNP

## 2022-01-31 NOTE — TELEPHONE ENCOUNTER
Left detailed message with identifiable voicemail that she does not have to be seen again for the refill on the Metoprolol, told her to let her pharmacy know when close to being out and will fill this for her.    CHRIS Brar

## 2022-02-03 ENCOUNTER — HOSPITAL ENCOUNTER (OUTPATIENT)
Dept: CARDIOLOGY | Facility: CLINIC | Age: 72
End: 2022-02-03
Attending: NURSE PRACTITIONER
Payer: COMMERCIAL

## 2022-02-03 ENCOUNTER — HOSPITAL ENCOUNTER (OUTPATIENT)
Dept: CT IMAGING | Facility: CLINIC | Age: 72
End: 2022-02-03
Attending: NURSE PRACTITIONER
Payer: COMMERCIAL

## 2022-02-03 DIAGNOSIS — R20.0 NUMBNESS AND TINGLING IN RIGHT HAND: ICD-10-CM

## 2022-02-03 DIAGNOSIS — I65.23 CAROTID ARTERY CALCIFICATION, BILATERAL: ICD-10-CM

## 2022-02-03 DIAGNOSIS — R20.2 NUMBNESS AND TINGLING IN RIGHT HAND: ICD-10-CM

## 2022-02-03 PROCEDURE — 93880 EXTRACRANIAL BILAT STUDY: CPT

## 2022-02-03 PROCEDURE — 93880 EXTRACRANIAL BILAT STUDY: CPT | Mod: 26 | Performed by: INTERNAL MEDICINE

## 2022-02-03 PROCEDURE — 70450 CT HEAD/BRAIN W/O DYE: CPT

## 2022-02-04 DIAGNOSIS — I10 ESSENTIAL HYPERTENSION WITH GOAL BLOOD PRESSURE LESS THAN 140/90: ICD-10-CM

## 2022-02-04 NOTE — TELEPHONE ENCOUNTER
Reason for Call:  Results.      Detailed comments:  Got her results from lab and other testing from [facility] but did not understand some of it and wants to talk to Claire Amos or nurse to discuss results.      Phone Number Patient can be reached at: [phone]    Can we leave a detailed message on this number? Yes    Call taken on 2/4/2022 at 11:54 AM by Yesenia Faulkner

## 2022-02-07 RX ORDER — METOPROLOL SUCCINATE 50 MG/1
TABLET, EXTENDED RELEASE ORAL
Qty: 90 TABLET | Refills: 3 | Status: SHIPPED | OUTPATIENT
Start: 2022-02-07 | End: 2022-12-07

## 2022-02-07 RX ORDER — METOPROLOL SUCCINATE 100 MG/1
100 TABLET, EXTENDED RELEASE ORAL DAILY
Qty: 90 TABLET | Refills: 3 | Status: SHIPPED | OUTPATIENT
Start: 2022-02-07 | End: 2022-12-07

## 2022-02-07 NOTE — TELEPHONE ENCOUNTER
Pt would like to talk to someone about her US results.  She can be reached until 11:00 and then has to leave for work.      When she is at work her phone is locked up in a locker so she is unable to answer.

## 2022-02-07 NOTE — TELEPHONE ENCOUNTER
Notified patient Claire Amos has not reviewed results yet. We will contact her after she reviews. Patient reassured if results were critical the radiologist would have notified us right away.  Mary SAPP RN

## 2022-02-09 NOTE — TELEPHONE ENCOUNTER
Pt was called to make sure of medication refill, no further questions, she has appt scheduled for 2/17/2022. Cindy Kwong RN

## 2022-02-09 NOTE — TELEPHONE ENCOUNTER
See result note sent to patient.   Please schedule virtual visit to discuss further if needed.  Thanks Claire Amos [facility]

## 2022-02-17 ENCOUNTER — OFFICE VISIT (OUTPATIENT)
Dept: FAMILY MEDICINE | Facility: CLINIC | Age: 72
End: 2022-02-17
Payer: COMMERCIAL

## 2022-02-17 VITALS
WEIGHT: 115 LBS | TEMPERATURE: 97.3 F | OXYGEN SATURATION: 99 % | SYSTOLIC BLOOD PRESSURE: 138 MMHG | DIASTOLIC BLOOD PRESSURE: 60 MMHG | BODY MASS INDEX: 20.37 KG/M2 | RESPIRATION RATE: 14 BRPM | HEART RATE: 80 BPM

## 2022-02-17 DIAGNOSIS — I65.23 BILATERAL CAROTID ARTERY STENOSIS: Primary | ICD-10-CM

## 2022-02-17 PROCEDURE — 99214 OFFICE O/P EST MOD 30 MIN: CPT | Performed by: NURSE PRACTITIONER

## 2022-02-17 RX ORDER — ROSUVASTATIN CALCIUM 5 MG/1
5 TABLET, COATED ORAL DAILY
Qty: 90 TABLET | Refills: 3 | Status: SHIPPED | OUTPATIENT
Start: 2022-02-17 | End: 2022-12-07

## 2022-02-17 ASSESSMENT — PAIN SCALES - GENERAL: PAINLEVEL: NO PAIN (0)

## 2022-02-17 NOTE — PROGRESS NOTES
Assessment & Plan     Bilateral carotid artery stenosis  Symptomatic care strategies reviewed.  Begin statin  - rosuvastatin (CRESTOR) 5 MG tablet  Dispense: 90 tablet; Refill: 3  Repeat labs in 3 months to monitor  Future lab orders placed  - **AST FUTURE 2mo  - Lipid panel reflex to direct LDL Fasting      Call or return to the clinic with any worsening of symptoms or no resolution. Patient/Parent verbalized understanding and is in agreement. Medication side effects reviewed.   Current Outpatient Medications   Medication Sig Dispense Refill     aspirin 81 MG tablet Take by mouth daily 30 tablet      Cholecalciferol (D3 ADULT PO)        metoprolol succinate ER (TOPROL-XL) 100 MG 24 hr tablet Take 1 tablet (100 mg) by mouth daily 90 tablet 3     metoprolol succinate ER (TOPROL-XL) 50 MG 24 hr tablet TAKE 1 TABLET BY MOUTH ONCE DAILY ADD  TO  100  MG   YOU  ARE  TAKING  NOW 90 tablet 3     MULTIPLE VITAMIN PO        rosuvastatin (CRESTOR) 5 MG tablet Take 1 tablet (5 mg) by mouth daily 90 tablet 3     Chart documentation with Dragon Voice recognition Software. Although reviewed after completion, some words and grammatical errors may remain.    [facility]    Estefania Otto is a 71 year old who presents for the following health issues     HPI     US Results     Impression:     1. Right internal carotid artery: There is <50% diameter stenosis by  velocity criteria. Mild-moderate mixed plaque in the common carotid  artery and carotid bifurcation.     2. Left internal carotid artery: There is <50% diameter stenosis by  velocity criteria. Mild-moderate mixed plaque in the common carotid  artery and carotid bifurcation, extending into the proximal ICA.     3. Bilateral vertebral arteries demonstrate antegrade flow.      4. There are no prior studies available for comparison.  Findings:     Right side:   Mild-moderate mixed plaque in the common carotid artery  and carotid  bifurcation. Moderate plaque in the proximal Right ECA.         Review of Systems   Constitutional, HEENT, cardiovascular, pulmonary, GI, , musculoskeletal, neuro, skin, endocrine and psych systems are negative, except as otherwise noted.      Objective    /60   Pulse 80   Temp 97.3  F (36.3  C) (Tympanic)   Resp 14   Wt 52.2 kg (115 lb)   LMP  (LMP Unknown)   SpO2 99%   BMI 20.37 kg/m    Body mass index is 20.37 kg/m .  Physical Exam   GENERAL: healthy, alert and no distress  EYES: Eyes grossly normal to inspection, PERRL and conjunctivae and sclerae normal  HENT: ear canals and TM's normal, nose and mouth without ulcers or lesions  NECK: no adenopathy, no asymmetry, masses, or scars and thyroid normal to palpation  : no carotid bruits  RESP: lungs clear to auscultation - no rales, rhonchi or wheezes  CV: regular rate and rhythm, normal S1 S2, no S3 or S4, no murmur, click or rub, no peripheral edema and peripheral pulses strong  ABDOMEN: soft, nontender, no hepatosplenomegaly, no masses and bowel sounds normal  MS: no gross musculoskeletal defects noted, no edema  Numbness in the right fingers that are improving  No neck pain at this time  Weakness in the right arm improving  SKIN: no suspicious lesions or rashes  NEURO: Normal strength and tone, mentation intact and speech normal  PSYCH: mentation appears normal, affect normal/bright    Office Visit on 01/28/2022   Component Date Value Ref Range Status     Sodium 01/28/2022 137  133 - 144 mmol/L Final     Potassium 01/28/2022 4.3  3.4 - 5.3 mmol/L Final     Chloride 01/28/2022 104  94 - 109 mmol/L Final     Carbon Dioxide (CO2) 01/28/2022 29  20 - 32 mmol/L Final     Anion Gap 01/28/2022 4  3 - 14 mmol/L Final     Urea Nitrogen 01/28/2022 18  7 - 30 mg/dL Final     Creatinine 01/28/2022 0.98  0.52 - 1.04 mg/dL Final     Calcium 01/28/2022 9.0  8.5 - 10.1 mg/dL Final     Glucose 01/28/2022 92  70 - 99 mg/dL Final     Alkaline  Phosphatase 01/28/2022 76  40 - 150 U/L Final     AST 01/28/2022 26  0 - 45 U/L Final     ALT 01/28/2022 24  0 - 50 U/L Final     Protein Total 01/28/2022 7.8  6.8 - 8.8 g/dL Final     Albumin 01/28/2022 4.0  3.4 - 5.0 g/dL Final     Bilirubin Total 01/28/2022 0.5  0.2 - 1.3 mg/dL Final     GFR Estimate 01/28/2022 62  >60 mL/min/1.73m2 Final    Effective December 21, 2021 eGFRcr in adults is calculated using the 2021 CKD-EPI creatinine equation which includes age and gender (Matthew et al., NEJ, DOI: 10.1056/SNFXbu7395332)     Vitamin B12 01/28/2022 463  193 - 986 pg/mL Final     Cholesterol 01/28/2022 159  <200 mg/dL Final     Triglycerides 01/28/2022 73  <150 mg/dL Final     Direct Measure HDL 01/28/2022 70  >=50 mg/dL Final     LDL Cholesterol Calculated 01/28/2022 74  <=100 mg/dL Final     Non HDL Cholesterol 01/28/2022 89  <130 mg/dL Final     Patient Fasting > 8hrs? 01/28/2022 Yes   Final     WBC Count 01/28/2022 6.7  4.0 - 11.0 10e3/uL Final     RBC Count 01/28/2022 4.33  3.80 - 5.20 10e6/uL Final     Hemoglobin 01/28/2022 13.7  11.7 - 15.7 g/dL Final     Hematocrit 01/28/2022 41.6  35.0 - 47.0 % Final     MCV 01/28/2022 96  78 - 100 fL Final     MCH 01/28/2022 31.6  26.5 - 33.0 pg Final     MCHC 01/28/2022 32.9  31.5 - 36.5 g/dL Final     RDW 01/28/2022 11.8  10.0 - 15.0 % Final     Platelet Count 01/28/2022 229  150 - 450 10e3/uL Final     % Neutrophils 01/28/2022 59  % Final     % Lymphocytes 01/28/2022 20  % Final     % Monocytes 01/28/2022 20  % Final     % Eosinophils 01/28/2022 1  % Final     % Basophils 01/28/2022 0  % Final     Absolute Neutrophils 01/28/2022 4.0  1.6 - 8.3 10e3/uL Final     Absolute Lymphocytes 01/28/2022 1.3  0.8 - 5.3 10e3/uL Final     Absolute Monocytes 01/28/2022 1.3  0.0 - 1.3 10e3/uL Final     Absolute Eosinophils 01/28/2022 0.1  0.0 - 0.7 10e3/uL Final     Absolute Basophils 01/28/2022 0.0  0.0 - 0.2 10e3/uL Final     RBC Morphology 01/28/2022 Confirmed RBC Indices   Final      Platelet Assessment 01/28/2022 Automated Count Confirmed. Platelet morphology is normal.  Automated Count Confirmed. Platelet morphology is normal. Final               Answers for HPI/ROS submitted by the patient on 2/17/2022  Frequency of checking blood sugars:: not at all  Hypoglycemia symptoms:: none  Dietary sodium intake:: No

## 2022-02-17 NOTE — PATIENT INSTRUCTIONS
Patient Education     Carotid Artery Disease  What is carotid artery disease?   The carotid arteries are the main blood vessels that send blood and oxygen to the brain. When these vessels become narrowed, it s called carotid artery disease. It may also be called carotid artery stenosis. The narrowing is caused by atherosclerosis. This is the buildup of fatty deposits, calcium, and other things inside the artery. Carotid artery disease is like coronary artery disease.In that disease, buildup occurs in the arteries of the heart.That can cause a heart attack.  Carotid artery disease reduces the flow of oxygen to the brain. The brain needs a constant supply of oxygen to work. Even a brief pause in blood supply can cause problems. Brain cells start to die after just a few minutes without blood or oxygen. If the narrowing of the carotid arteries is severe enough that blood flow is blocked, it can cause a stroke. If a piece of plaque breaks off it can block blood flow to the brain. This too can cause a stroke.  What causes carotid artery disease?   Atherosclerosis causes most carotid artery disease. In this condition, fatty deposits build up along the lining of the arteries.This is called plaque. The plaque narrows the insides of the arteries.This decreases blood flow or fully blocks the flow of blood to the brain.  Who is at risk for carotid artery disease?   Risk factors linked with atherosclerosis include:    Older age    Male    Family history    Race    Genetic factors    High cholesterol    High blood pressure    Smoking    Diabetes    Overweight    Diet high in saturated fat    Lack of exercise  These factors increase a person's risk. But they don't always cause the disease. Knowing your risk factors can help you make lifestyle changes.You can work with your healthcare provider to reduce the chance you will get the disease.  What are the symptoms of carotid artery disease?   The disease may have no symptoms. In  some cases, the first sign of the disease is a transient ischemic attack (TIA) or stroke.  A TIA is a sudden, short-term loss of blood flow to a part of the brain. It usually lasts a few minutes to an hour. Symptoms go away fully within 24 hours. There are no lasting effects. When symptoms continue, it is a stroke. Symptoms of a TIA or stroke may include:    Sudden weakness or clumsiness of an arm or leg on 1 side of the body    Sudden paralysis of an arm or leg on 1 side of the body    Loss of coordination or movement    Confusion, loss of ability to concentrate     Dizziness, fainting, or headache    Numbness or loss of feeling in the face or in an arm or leg    Temporary loss of vision or blurred vision    Inability to speak clearly or slurred speech  If you or a loved one has any of these symptoms, call for medical help right away. A TIA may be a warning sign that a stroke is about to occur. But TIAs don't precede all strokes.  The symptoms of a TIA and stroke are the same. A stroke is loss of blood flow (ischemia) to the brain that lasts long enough to cause brain damage. Brain cells start to die after just a few minutes without oxygen.  The effects after a stroke depends on the size and place in the brain that had loss of blood flow. This may include problems with:    Moving    Speaking    Thinking    Remembering    Bowel and bladder function    Eating    Emotional control    Other vital body functions  Recovery also depends on the size and place of the stroke. A stroke may result in long-term problems, such as weakness in an arm or leg. It may cause paralysis, loss of speech, or even death.  The symptoms of carotid artery disease may look like other health problems. See your healthcare provider for a diagnosis.  How is carotid artery disease diagnosed?   Your healthcare provider will ask about your health history.He or she will give you a physical exam. You will need tests. These may include:    Listening to  the carotid arteries.For this test, your healthcare provider places a stethoscope over the carotid artery. This is done to listen for a sound called a bruit (BREW-ee). This sound is made when blood passes through a narrowed artery. A bruit can be a sign of atherosclerosis. But an artery may be diseased and not make this sound.    Carotid artery duplex scan. This test is done to assess the blood flow of the carotid arteries. A probe called a transducer sends out ultrasonic sound waves. The transducer is also like a microphone. It is placed on the carotid arteries at certain locations and angles. The sound waves move through the skin and other body tissues to the blood vessels. The sound waves echo off of the blood cells. The transducer sends the waves to an amplifier. Your healthcare provider can hear the sound waves. Not enough or no sounds may mean blood flow is blocked.    MRI. This test uses large magnets, radio waves, and a computer to make detailed images of tissues in the body. For this test, you lie inside a big tube while magnets pass around your body. It s very loud.     MR angiography (MRA). This test uses MRI technology and IV contrast dye to make the blood vessels visible. Contrast dye causes blood vessels to show up well on the MRI image. This is so the doctor can see them.    CT angiography (CTA). This test uses X-rays and a computer along with contrast dye to make detailed images of the body. A CTA shows pictures of blood vessels and tissues.It helps find narrowed blood vessels.      Angiography. This test is used to see how blocked the carotid arteries are. It is done by taking X-ray images while a contrast dye is injected. The contrast dye shows the shape and flow of blood through the arteries as X-ray images are made.  How is carotid artery disease treated?   Treatment will depend on your symptoms, age, and general health. It will also depend on how severe the condition is.  If a carotid artery is  less than half narrowed, it is often treated with medicine and lifestyle changes. If the artery is between 50% and 70% narrowed, medicine or surgery may be done.  Medical treatment may include the below.  Lifestyle changes    Quitting smoking.This can reduce the risk for carotid artery disease and cardiovascular disease. All nicotine products constrict the blood vessels. This includes electronic cigarettes. This decreases blood flow through the arteries.    Lowering cholesterol. Eat a low-fat, low-cholesterol diet. Eat plenty of vegetables, lean meats (no red meats), fruits, and high-fiber grains. Don't eat processed foods, or foods high in saturated and trans-fats. When diet and exercise are not enough to control cholesterol, you may need medicines.    Lowering blood sugar. High blood sugar (glucose) can cause damage to the lining of the carotid arteries. Control glucose levels through a low-sugar diet, and regular exercise. If you have diabetes, you may need medicine or other treatment.    Exercising. Lack of exercise can cause weight gain.It can raise blood pressure and cholesterol. Exercise can help you keep a healthy weight and reduce risks for carotid artery disease.    Lowering blood pressure.High blood pressure causes wear and tear and inflammation in blood vessels.This raises the risk for artery narrowing. Blood pressure should be below 140/90 mm/Hg for most people. People with diabetes may need even lower blood pressure.    Medicines  Medicines that may be used include:    Antiplatelets.These medicines make platelets in the blood less able to stick together and cause clots. These medicines include aspirin, clopidogrel, and dipyridamole.    Cholesterol medicines.Statins are a type of medicines that lower cholesterol. They include simvastatin and atorvastatin. Studies have shown that some statins can decrease the thickness of the carotid artery wall.This can increase the size of the opening of the  artery.    Blood pressure medicines.Several types of medicines work to lower blood pressure.  If a carotid artery is narrowed from 50% to 70%, you may need stronger treatment, especially if you have symptoms.  Surgery is usually advised for carotid narrowing of more than 70%. Surgery lowers the risk for stroke after symptoms such as TIA or minor stroke.  Types of surgery include:    Carotid endarterectomy (CEA).This is surgery to remove plaque and blood clots from the carotid arteries. CEA may help prevent a stroke in people who have symptoms and a narrowing of 70% or more.    Carotid artery angioplasty with stenting (JIN).This is an option for people who are unable to have CEA. It uses a very small tube (catheter).This tube is put into a blood vessel in the groin. It is pushed up to the carotid arteries. Once the tube is in place, a small balloon is inflated at the tip of it.This opens the artery.Then a stent is put in place. A stent is a thin, metal-mesh tube. It is used to hold the artery open.  What are possible complications of carotid artery disease?  The main complication is a stroke. A stroke can cause serious disability.And may cause death.  Can carotid artery disease be prevented?   You can prevent or delay the disease like you would prevent heart disease. This includes:    Diet changes.Eat a healthy diet.It should include plenty of fresh fruits and vegetables. Eat lean meats such as poultry and fish. And eat low-fat or non-fat dairy foods. Limit your intake of salt, sugar, processed foods, saturated fats, and alcohol.     Exercise.Aim for 40 minutes of moderate to vigorous physical activity at least 3 to 4 days per week.    Manage your weight.If you are overweight, take steps to lose weight.     Quit smoking.If you smoke, break the habit. Enroll in a stop-smoking program. This can improve your chances of success. Ask your doctor about prescription medicine.    Control stress.Learn ways to manage stress in  your home and work life.  When should I call my healthcare provider?   Learn the symptoms of stroke.Have your family members also learn them. If you think you are having symptoms of a stroke, call 911 right away.  Key points about carotid artery disease    Carotid artery disease is narrowing of the carotid arteries. These arteries send oxygen-rich blood from the heart to the brain.    Narrowing of the carotid arteries can cause a stroke.Symptoms of a stroke should be treated right away.    Eating a healthy diet is 1 way to reduce the risk of carotid artery disease. Exercise, quitting smoking, blood pressure control, and medicine can also help.    Opening the carotid arteries can be done with a surgery or with angioplasty and a stent.    Carotid artery disease may not have symptoms. But if you have risk factors, see your healthcare provider for screening and diagnosis.    Next steps  Tips to help you get the most from a visit to your healthcare provider:    Know the reason for your visit and what you want to happen.    Before your visit, write down questions you want answered.    Bring someone with you to help you ask questions and remember what your provider tells you.    At the visit, write down the name of a new diagnosis, and any new medicines, treatments, or tests. Also write down any new instructions your provider gives you.    Know why a new medicine or treatment is prescribed, and how it will help you. Also know what the side effects are.    Ask if your condition can be treated in other ways.    Know why a test or procedure is recommended and what the results could mean.    Know what to expect if you do not take the medicine or have the test or procedure.    If you have a follow-up appointment, write down the date, time, and purpose for that visit.    Know how you can contact your provider if you have questions.  [employer] last reviewed this educational content on 12/1/2018 2000-2021 [employer]. All rights reserved. This information is not intended as a substitute for professional medical care. Always follow your healthcare professional's instructions.

## 2022-03-06 ENCOUNTER — HEALTH MAINTENANCE LETTER (OUTPATIENT)
Age: 72
End: 2022-03-06

## 2022-03-29 ENCOUNTER — TELEPHONE (OUTPATIENT)
Dept: FAMILY MEDICINE | Facility: CLINIC | Age: 72
End: 2022-03-29
Payer: COMMERCIAL

## 2022-03-29 NOTE — TELEPHONE ENCOUNTER
Reason for Call:  Other     Detailed comments: Pt had a my Chart message that she was due for AST and Lipids by 4/18/22. She is wondering why she has to do this since she had her labs done 1/28/22 and they were normal.    Phone Number Patient can be reached at: Home number on file [phone] (home)    Best Time:  Leaves for work at 1:00 and her phone doesn't work when she is there. She thinks even if her phone is on, we could leave a message    Can we leave a detailed message on this number? YES    Call taken on 3/29/2022 at 9:50 AM by Tasha Day

## 2022-05-14 ENCOUNTER — ANCILLARY PROCEDURE (OUTPATIENT)
Dept: MAMMOGRAPHY | Facility: CLINIC | Age: 72
End: 2022-05-14
Attending: NURSE PRACTITIONER
Payer: COMMERCIAL

## 2022-05-14 DIAGNOSIS — Z12.31 VISIT FOR SCREENING MAMMOGRAM: ICD-10-CM

## 2022-05-14 PROCEDURE — 77067 SCR MAMMO BI INCL CAD: CPT | Mod: TC | Performed by: RADIOLOGY

## 2022-05-14 PROCEDURE — 77063 BREAST TOMOSYNTHESIS BI: CPT | Mod: TC | Performed by: RADIOLOGY

## 2022-09-23 ASSESSMENT — ENCOUNTER SYMPTOMS
SORE THROAT: 0
HEARTBURN: 0
ABDOMINAL PAIN: 0
EYE PAIN: 0
CONSTIPATION: 0
PARESTHESIAS: 0
FREQUENCY: 0
HEMATOCHEZIA: 0
ARTHRALGIAS: 0
COUGH: 0
DYSURIA: 0
SHORTNESS OF BREATH: 0
DIZZINESS: 0
JOINT SWELLING: 0
FEVER: 0
BREAST MASS: 0
NAUSEA: 0
DIARRHEA: 0
NERVOUS/ANXIOUS: 0
CHILLS: 0
WEAKNESS: 0
HEADACHES: 0
MYALGIAS: 0
PALPITATIONS: 0
HEMATURIA: 0

## 2022-09-23 ASSESSMENT — ACTIVITIES OF DAILY LIVING (ADL): CURRENT_FUNCTION: NO ASSISTANCE NEEDED

## 2022-09-29 ENCOUNTER — OFFICE VISIT (OUTPATIENT)
Dept: FAMILY MEDICINE | Facility: CLINIC | Age: 72
End: 2022-09-29
Payer: COMMERCIAL

## 2022-09-29 VITALS
WEIGHT: 113 LBS | DIASTOLIC BLOOD PRESSURE: 66 MMHG | HEIGHT: 64 IN | HEART RATE: 68 BPM | RESPIRATION RATE: 22 BRPM | SYSTOLIC BLOOD PRESSURE: 128 MMHG | TEMPERATURE: 97.8 F | OXYGEN SATURATION: 97 % | BODY MASS INDEX: 19.29 KG/M2

## 2022-09-29 DIAGNOSIS — Z86.0100 HISTORY OF COLONIC POLYPS: ICD-10-CM

## 2022-09-29 DIAGNOSIS — Z12.11 COLON CANCER SCREENING: Primary | ICD-10-CM

## 2022-09-29 PROCEDURE — G0439 PPPS, SUBSEQ VISIT: HCPCS | Performed by: NURSE PRACTITIONER

## 2022-09-29 ASSESSMENT — ENCOUNTER SYMPTOMS
JOINT SWELLING: 0
DIARRHEA: 0
CONSTIPATION: 0
WEAKNESS: 0
NERVOUS/ANXIOUS: 0
DIZZINESS: 0
FREQUENCY: 0
SHORTNESS OF BREATH: 0
HEMATOCHEZIA: 0
ABDOMINAL PAIN: 0
PALPITATIONS: 0
HEADACHES: 0
PARESTHESIAS: 0
ARTHRALGIAS: 0
DYSURIA: 0
FEVER: 0
CHILLS: 0
MYALGIAS: 0
EYE PAIN: 0
BREAST MASS: 0
SORE THROAT: 0
COUGH: 0
HEMATURIA: 0
NAUSEA: 0
HEARTBURN: 0

## 2022-09-29 ASSESSMENT — PAIN SCALES - GENERAL: PAINLEVEL: NO PAIN (0)

## 2022-09-29 ASSESSMENT — ACTIVITIES OF DAILY LIVING (ADL): CURRENT_FUNCTION: NO ASSISTANCE NEEDED

## 2022-09-29 NOTE — PROGRESS NOTES
"SUBJECTIVE:   Clarita is a 72 year old who presents for Preventive Visit.      Patient has been advised of split billing requirements and indicates understanding: Yes  Are you in the first 12 months of your Medicare coverage?  No    Healthy Habits:     In general, how would you rate your overall health?  Good    Frequency of exercise:  4-5 days/week    Duration of exercise:  15-30 minutes    Do you usually eat at least 4 servings of fruit and vegetables a day, include whole grains    & fiber and avoid regularly eating high fat or \"junk\" foods?  No    Taking medications regularly:  Yes    Medication side effects:  None    Ability to successfully perform activities of daily living:  No assistance needed    Home Safety:  No safety concerns identified    Hearing Impairment:  No hearing concerns    In the past 6 months, have you been bothered by leaking of urine?  No    In general, how would you rate your overall mental or emotional health?  Excellent      PHQ-2 Total Score: 0    Additional concerns today:  No    Do you feel safe in your environment? Yes    Have you ever done Advance Care Planning? (For example, a Health Directive, POLST, or a discussion with a medical provider or your loved ones about your wishes): Yes, advance care planning is on file.       Fall risk  Fallen 2 or more times in the past year?: No  Any fall with injury in the past year?: No    Cognitive Screening   1) Repeat 3 items (Leader, Season, Table)    2) Clock draw: NORMAL  3) 3 item recall: Recalls 2 objects   Results: NORMAL clock, 1-2 items recalled: COGNITIVE IMPAIRMENT LESS LIKELY    Mini-CogTM Copyright JEFRY Gonsales. Licensed by the author for use in [facility]; reprinted with permission (tulio@.[email or web address]). All rights reserved.      Do you have sleep apnea, excessive snoring or daytime drowsiness?: no    Reviewed and updated as needed this visit by clinical staff   Tobacco  Allergies  Meds   Med Hx  Surg Hx  Fam Hx  Soc Hx      "     Reviewed and updated as needed this visit by Provider                   Social History     Tobacco Use     Smoking status: Former Smoker     Packs/day: 1.00     Years: 35.00     Pack years: 35.00     Types: Cigarettes     Quit date: 3/13/2012     Years since quitting: 10.5     Smokeless tobacco: Never Used   Substance Use Topics     Alcohol use: Yes     Comment: few drinks a week     If you drink alcohol do you typically have >3 drinks per day or >7 drinks per week? No    Alcohol Use 9/23/2022   Prescreen: >3 drinks/day or >7 drinks/week? No   Prescreen: >3 drinks/day or >7 drinks/week? -           Current providers sharing in care for this patient include:   Patient Care Team:  Claire Amos APRN CNP as PCP - General (Family Practice)  Claire Amos APRN CNP as Assigned PCP    The following health maintenance items are reviewed in Epic and correct as of today:  Health Maintenance   Topic Date Due     COVID-19 Vaccine (1) Never done     ZOSTER IMMUNIZATION (1 of 2) Never done     LUNG CANCER SCREENING  Never done     Pneumococcal Vaccine: 65+ Years (1 - PCV) Never done     INFLUENZA VACCINE (1) Never done     ANNUAL REVIEW OF HM ORDERS  01/28/2023     MEDICARE ANNUAL WELLNESS VISIT  09/29/2023     FALL RISK ASSESSMENT  09/29/2023     MAMMO SCREENING  05/14/2024     DTAP/TDAP/TD IMMUNIZATION (3 - Td or Tdap) 07/29/2025     ADVANCE CARE PLANNING  08/12/2025     COLORECTAL CANCER SCREENING  09/01/2025     LIPID  01/28/2027     DEXA  10/16/2032     HEPATITIS C SCREENING  Completed     PHQ-2 (once per calendar year)  Completed     IPV IMMUNIZATION  Aged Out     MENINGITIS IMMUNIZATION  Aged Out     HEPATITIS B IMMUNIZATION  Aged Out     BP Readings from Last 3 Encounters:   09/29/22 128/66   02/17/22 138/60   01/28/22 134/72    Wt Readings from Last 3 Encounters:   09/29/22 51.3 kg (113 lb)   02/17/22 52.2 kg (115 lb)   01/28/22 52.3 kg (115 lb 3.2 oz)                  Patient Active Problem List    Diagnosis     Chest pain     Hyperlipidemia LDL goal <160     HTN, goal below 140/90     Benign neoplasm of colon     Bilateral carotid artery stenosis     Past Surgical History:   Procedure Laterality Date     COLONOSCOPY N/A 9/1/2015    Procedure: COLONOSCOPY;  Surgeon: Mayelin Suresh MD;  Location: [facility]     SURGICAL HISTORY OF -       cyst removal from left knee     SURGICAL HISTORY OF -       cryotherapy and laparoscopy - unclear reason why     TUBAL LIGATION  1982    Laparoscopic bilateral tubal ligation       Social History     Tobacco Use     Smoking status: Former Smoker     Packs/day: 1.00     Years: 35.00     Pack years: 35.00     Types: Cigarettes     Quit date: 3/13/2012     Years since quitting: 10.5     Smokeless tobacco: Never Used   Substance Use Topics     Alcohol use: Yes     Comment: few drinks a week     Family History   Problem Relation Age of Onset     Cerebrovascular Disease Sister 27     C.A.D. Father 63     Depression Mother      Musculoskeletal Disorder Brother         polio         Current Outpatient Medications   Medication Sig Dispense Refill     aspirin 81 MG tablet Take by mouth daily 30 tablet      Cholecalciferol (D3 ADULT PO)        metoprolol succinate ER (TOPROL-XL) 100 MG 24 hr tablet Take 1 tablet (100 mg) by mouth daily 90 tablet 3     metoprolol succinate ER (TOPROL-XL) 50 MG 24 hr tablet TAKE 1 TABLET BY MOUTH ONCE DAILY ADD  TO  100  MG   YOU  ARE  TAKING  NOW 90 tablet 3     MULTIPLE VITAMIN PO        rosuvastatin (CRESTOR) 5 MG tablet Take 1 tablet (5 mg) by mouth daily 90 tablet 3     No Known Allergies  Mammogram Screening: Mammogram Screening: Recommended mammography every 1-2 years with patient discussion and risk factor consideration  Last 3 Pap and HPV Results:   PAP / HPV Latest Ref Rng & Units 9/27/2016   PAP (Historical) - NIL   HPV16 NEG Negative   HPV18 NEG Negative   HRHPV NEG Negative           Review of Systems   Constitutional: Negative for  "chills and fever.   HENT: Negative for congestion, ear pain, hearing loss and sore throat.    Eyes: Negative for pain and visual disturbance.   Respiratory: Negative for cough and shortness of breath.    Cardiovascular: Negative for chest pain, palpitations and peripheral edema.   Gastrointestinal: Negative for abdominal pain, constipation, diarrhea, heartburn, hematochezia and nausea.   Breasts:  Negative for tenderness, breast mass and discharge.   Genitourinary: Negative for dysuria, frequency, genital sores, hematuria, pelvic pain, urgency, vaginal bleeding and vaginal discharge.   Musculoskeletal: Negative for arthralgias, joint swelling and myalgias.   Skin: Negative for rash.   Neurological: Negative for dizziness, weakness, headaches and paresthesias.   Psychiatric/Behavioral: Negative for mood changes. The patient is not nervous/anxious.          OBJECTIVE:   /66 (BP Location: Right arm, Patient Position: Sitting, Cuff Size: Adult Regular)   Pulse 68   Temp 97.8  F (36.6  C) (Tympanic)   Resp 22   Ht 1.613 m (5' 3.5\")   Wt 51.3 kg (113 lb)   LMP  (LMP Unknown)   SpO2 97%   BMI 19.70 kg/m   Estimated body mass index is 19.7 kg/m  as calculated from the following:    Height as of this encounter: 1.613 m (5' 3.5\").    Weight as of this encounter: 51.3 kg (113 lb).  Physical Exam  GENERAL APPEARANCE: healthy, alert and no distress  EYES: Eyes grossly normal to inspection, PERRL and conjunctivae and sclerae normal  HENT: ear canals and TM's normal, nose and mouth without ulcers or lesions, oropharynx clear and oral mucous membranes moist  NECK: no adenopathy, no asymmetry, masses, or scars and thyroid normal to palpation  RESP: lungs clear to auscultation - no rales, rhonchi or wheezes  BREAST: declined  CV: regular rate and rhythm, normal S1 S2, no S3 or S4, no murmur, click or rub, no peripheral edema and peripheral pulses strong  ABDOMEN: soft, nontender, no hepatosplenomegaly, no masses and " "bowel sounds normal  MS: no musculoskeletal defects are noted and gait is age appropriate without ataxia  SKIN: no suspicious lesions or rashes  NEURO: Normal strength and tone, sensory exam grossly normal, mentation intact and speech normal  PSYCH: mentation appears normal and affect normal/bright    Diagnostic Test Results:  Labs reviewed in Epic  No results found for this or any previous visit (from the past 24 hour(s)).    ASSESSMENT / PLAN:   Clarita was seen today for physical.    Diagnoses and all orders for this visit:    Colon cancer screening  -     Colonoscopy Screening  Referral; Future    History of colonic polyps  -     Colonoscopy Screening  Referral; Future            COUNSELING:  Reviewed preventive health counseling, as reflected in patient instructions    Estimated body mass index is 19.7 kg/m  as calculated from the following:    Height as of this encounter: 1.613 m (5' 3.5\").    Weight as of this encounter: 51.3 kg (113 lb).    Weight management plan noted, stable and monitoring    She reports that she quit smoking about 10 years ago. Her smoking use included cigarettes. She has a 35.00 pack-year smoking history. She has never used smokeless tobacco.      Appropriate preventive services were discussed with this patient, including applicable screening as appropriate for cardiovascular disease, diabetes, osteopenia/osteoporosis, and glaucoma.  As appropriate for age/gender, discussed screening for colorectal cancer, prostate cancer, breast cancer, and cervical cancer. Checklist reviewing preventive services available has been given to the patient.    Reviewed patients plan of care and provided an AVS. The Basic Care Plan (routine screening as documented in Health Maintenance) for Clarita meets the Care Plan requirement. This Care Plan has been established and reviewed with the Patient.    Call or return to the clinic with any worsening of symptoms or no resolution. Patient/Parent " verbalized understanding and is in agreement. Medication side effects reviewed.   Current Outpatient Medications   Medication Sig Dispense Refill     aspirin 81 MG tablet Take by mouth daily 30 tablet      Cholecalciferol (D3 ADULT PO)        metoprolol succinate ER (TOPROL-XL) 100 MG 24 hr tablet Take 1 tablet (100 mg) by mouth daily 90 tablet 3     metoprolol succinate ER (TOPROL-XL) 50 MG 24 hr tablet TAKE 1 TABLET BY MOUTH ONCE DAILY ADD  TO  100  MG   YOU  ARE  TAKING  NOW 90 tablet 3     MULTIPLE VITAMIN PO        rosuvastatin (CRESTOR) 5 MG tablet Take 1 tablet (5 mg) by mouth daily 90 tablet 3     Chart documentation with Dragon Voice recognition Software. Although reviewed after completion, some words and grammatical errors may remain.    LANA [facility]    Identified Health Risks: CAD, Colon polyps

## 2022-10-10 ENCOUNTER — MYC MEDICAL ADVICE (OUTPATIENT)
Dept: GASTROENTEROLOGY | Facility: CLINIC | Age: 72
End: 2022-10-10

## 2022-10-10 ENCOUNTER — HOSPITAL ENCOUNTER (OUTPATIENT)
Facility: CLINIC | Age: 72
End: 2022-10-10
Attending: SURGERY | Admitting: SURGERY
Payer: COMMERCIAL

## 2022-10-10 NOTE — TELEPHONE ENCOUNTER
General Call    Contacts       Type Contact Phone/Fax    10/10/2022 12:57 PM CDT Phone (Incoming) Clarita Lowry (Self) [phone] ()        Reason for Call:  Pt called stating that she has an appt scheduled in January for a Colonoscopy. Pt is okay with that date, but wanting to make sure Claire Amos wasn't needing her to have this done sooner? Pt is fine with waiting.     What are your questions or concerns:  Does appt need to be done before January 2023?     Date of last appointment with provider: 9/29    Could we send this information to you in RELEASEIF or would you prefer to receive a phone call?:   Patient would prefer a phone call   Okay to leave a detailed message?: Yes at Home number on file [phone] ([place])

## 2022-11-20 ENCOUNTER — APPOINTMENT (OUTPATIENT)
Dept: CT IMAGING | Facility: CLINIC | Age: 72
End: 2022-11-20
Attending: EMERGENCY MEDICINE
Payer: COMMERCIAL

## 2022-11-20 ENCOUNTER — APPOINTMENT (OUTPATIENT)
Dept: MRI IMAGING | Facility: CLINIC | Age: 72
End: 2022-11-20
Attending: EMERGENCY MEDICINE
Payer: COMMERCIAL

## 2022-11-20 ENCOUNTER — HOSPITAL ENCOUNTER (OUTPATIENT)
Facility: CLINIC | Age: 72
Setting detail: OBSERVATION
Discharge: HOME | End: 2022-11-21
Attending: EMERGENCY MEDICINE | Admitting: INTERNAL MEDICINE
Payer: COMMERCIAL

## 2022-11-20 DIAGNOSIS — Z11.52 ENCOUNTER FOR SCREENING LABORATORY TESTING FOR SEVERE ACUTE RESPIRATORY SYNDROME CORONAVIRUS 2 (SARS-COV-2): ICD-10-CM

## 2022-11-20 DIAGNOSIS — I10 ESSENTIAL HYPERTENSION WITH GOAL BLOOD PRESSURE LESS THAN 140/90: ICD-10-CM

## 2022-11-20 DIAGNOSIS — G45.9 TIA (TRANSIENT ISCHEMIC ATTACK): Primary | ICD-10-CM

## 2022-11-20 LAB
ALBUMIN SERPL BCG-MCNC: 4.7 G/DL (ref 3.5–5.2)
ALBUMIN UR-MCNC: NEGATIVE MG/DL
ALP SERPL-CCNC: 66 U/L (ref 35–104)
ALT SERPL W P-5'-P-CCNC: 21 U/L (ref 10–35)
ANION GAP SERPL CALCULATED.3IONS-SCNC: 8 MMOL/L (ref 7–15)
APPEARANCE UR: CLEAR
APTT PPP: 26 SECONDS (ref 22–38)
AST SERPL W P-5'-P-CCNC: 29 U/L (ref 10–35)
BASOPHILS # BLD AUTO: 0.1 10E3/UL (ref 0–0.2)
BASOPHILS NFR BLD AUTO: 1 %
BILIRUB SERPL-MCNC: 0.3 MG/DL
BILIRUB UR QL STRIP: NEGATIVE
BUN SERPL-MCNC: 16.9 MG/DL (ref 8–23)
CALCIUM SERPL-MCNC: 9.7 MG/DL (ref 8.8–10.2)
CHLORIDE SERPL-SCNC: 102 MMOL/L (ref 98–107)
COLOR UR AUTO: NORMAL
CREAT SERPL-MCNC: 0.88 MG/DL (ref 0.51–0.95)
DEPRECATED HCO3 PLAS-SCNC: 29 MMOL/L (ref 22–29)
EOSINOPHIL # BLD AUTO: 0.3 10E3/UL (ref 0–0.7)
EOSINOPHIL NFR BLD AUTO: 4 %
ERYTHROCYTE [DISTWIDTH] IN BLOOD BY AUTOMATED COUNT: 11.9 % (ref 10–15)
GFR SERPL CREATININE-BSD FRML MDRD: 70 ML/MIN/1.73M2
GLUCOSE BLDC GLUCOMTR-MCNC: 98 MG/DL (ref 70–99)
GLUCOSE SERPL-MCNC: 95 MG/DL (ref 70–99)
GLUCOSE UR STRIP-MCNC: NEGATIVE MG/DL
HBA1C MFR BLD: 5.7 %
HCT VFR BLD AUTO: 37.2 % (ref 35–47)
HGB BLD-MCNC: 12.5 G/DL (ref 11.7–15.7)
HGB UR QL STRIP: NEGATIVE
HOLD SPECIMEN: NORMAL
IMM GRANULOCYTES # BLD: 0 10E3/UL
IMM GRANULOCYTES NFR BLD: 0 %
INR PPP: 0.97 (ref 0.85–1.15)
KETONES UR STRIP-MCNC: NEGATIVE MG/DL
LEUKOCYTE ESTERASE UR QL STRIP: NEGATIVE
LYMPHOCYTES # BLD AUTO: 2.4 10E3/UL (ref 0.8–5.3)
LYMPHOCYTES NFR BLD AUTO: 31 %
MCH RBC QN AUTO: 31.3 PG (ref 26.5–33)
MCHC RBC AUTO-ENTMCNC: 33.6 G/DL (ref 31.5–36.5)
MCV RBC AUTO: 93 FL (ref 78–100)
MONOCYTES # BLD AUTO: 0.8 10E3/UL (ref 0–1.3)
MONOCYTES NFR BLD AUTO: 10 %
NEUTROPHILS # BLD AUTO: 4.2 10E3/UL (ref 1.6–8.3)
NEUTROPHILS NFR BLD AUTO: 54 %
NITRATE UR QL: NEGATIVE
NRBC # BLD AUTO: 0 10E3/UL
NRBC BLD AUTO-RTO: 0 /100
PH UR STRIP: 5 [PH] (ref 5–7)
PLATELET # BLD AUTO: 244 10E3/UL (ref 150–450)
POTASSIUM SERPL-SCNC: 3.9 MMOL/L (ref 3.4–5.3)
PROT SERPL-MCNC: 7.4 G/DL (ref 6.4–8.3)
RBC # BLD AUTO: 4 10E6/UL (ref 3.8–5.2)
RBC URINE: 1 /HPF
SARS-COV-2 RNA RESP QL NAA+PROBE: NEGATIVE
SODIUM SERPL-SCNC: 139 MMOL/L (ref 136–145)
SP GR UR STRIP: 1.01 (ref 1–1.03)
SQUAMOUS EPITHELIAL: <1 /HPF
UROBILINOGEN UR STRIP-MCNC: NORMAL MG/DL
WBC # BLD AUTO: 7.7 10E3/UL (ref 4–11)
WBC URINE: 1 /HPF

## 2022-11-20 PROCEDURE — 70496 CT ANGIOGRAPHY HEAD: CPT

## 2022-11-20 PROCEDURE — 99285 EMERGENCY DEPT VISIT HI MDM: CPT | Mod: 25 | Performed by: EMERGENCY MEDICINE

## 2022-11-20 PROCEDURE — 250N000011 HC RX IP 250 OP 636: Performed by: EMERGENCY MEDICINE

## 2022-11-20 PROCEDURE — 70450 CT HEAD/BRAIN W/O DYE: CPT

## 2022-11-20 PROCEDURE — 93010 ELECTROCARDIOGRAM REPORT: CPT | Performed by: EMERGENCY MEDICINE

## 2022-11-20 PROCEDURE — 85025 COMPLETE CBC W/AUTO DIFF WBC: CPT | Performed by: EMERGENCY MEDICINE

## 2022-11-20 PROCEDURE — 93005 ELECTROCARDIOGRAM TRACING: CPT | Mod: 76 | Performed by: EMERGENCY MEDICINE

## 2022-11-20 PROCEDURE — 82310 ASSAY OF CALCIUM: CPT | Performed by: EMERGENCY MEDICINE

## 2022-11-20 PROCEDURE — 250N000009 HC RX 250: Performed by: EMERGENCY MEDICINE

## 2022-11-20 PROCEDURE — 255N000002 HC RX 255 OP 636: Performed by: EMERGENCY MEDICINE

## 2022-11-20 PROCEDURE — 93005 ELECTROCARDIOGRAM TRACING: CPT | Performed by: EMERGENCY MEDICINE

## 2022-11-20 PROCEDURE — 70498 CT ANGIOGRAPHY NECK: CPT

## 2022-11-20 PROCEDURE — 83036 HEMOGLOBIN GLYCOSYLATED A1C: CPT | Performed by: EMERGENCY MEDICINE

## 2022-11-20 PROCEDURE — C9803 HOPD COVID-19 SPEC COLLECT: HCPCS | Performed by: EMERGENCY MEDICINE

## 2022-11-20 PROCEDURE — 250N000013 HC RX MED GY IP 250 OP 250 PS 637: Performed by: EMERGENCY MEDICINE

## 2022-11-20 PROCEDURE — 70553 MRI BRAIN STEM W/O & W/DYE: CPT

## 2022-11-20 PROCEDURE — 87635 SARS-COV-2 COVID-19 AMP PRB: CPT | Performed by: EMERGENCY MEDICINE

## 2022-11-20 PROCEDURE — 85730 THROMBOPLASTIN TIME PARTIAL: CPT | Performed by: EMERGENCY MEDICINE

## 2022-11-20 PROCEDURE — 81001 URINALYSIS AUTO W/SCOPE: CPT | Performed by: EMERGENCY MEDICINE

## 2022-11-20 PROCEDURE — A9585 GADOBUTROL INJECTION: HCPCS | Performed by: EMERGENCY MEDICINE

## 2022-11-20 PROCEDURE — G0378 HOSPITAL OBSERVATION PER HR: HCPCS

## 2022-11-20 PROCEDURE — 85610 PROTHROMBIN TIME: CPT | Performed by: EMERGENCY MEDICINE

## 2022-11-20 PROCEDURE — 36415 COLL VENOUS BLD VENIPUNCTURE: CPT | Performed by: EMERGENCY MEDICINE

## 2022-11-20 RX ORDER — ASPIRIN 325 MG
325 TABLET ORAL ONCE
Status: COMPLETED | OUTPATIENT
Start: 2022-11-20 | End: 2022-11-20

## 2022-11-20 RX ORDER — OXYCODONE HYDROCHLORIDE 5 MG/1
5-10 TABLET ORAL
Status: DISCONTINUED | OUTPATIENT
Start: 2022-11-20 | End: 2022-11-21 | Stop reason: HOSPADM

## 2022-11-20 RX ORDER — GADOBUTROL 604.72 MG/ML
5 INJECTION INTRAVENOUS ONCE
Status: COMPLETED | OUTPATIENT
Start: 2022-11-20 | End: 2022-11-20

## 2022-11-20 RX ORDER — NALOXONE HYDROCHLORIDE 0.4 MG/ML
0.2 INJECTION, SOLUTION INTRAMUSCULAR; INTRAVENOUS; SUBCUTANEOUS
Status: DISCONTINUED | OUTPATIENT
Start: 2022-11-20 | End: 2022-11-21 | Stop reason: HOSPADM

## 2022-11-20 RX ORDER — ONDANSETRON 2 MG/ML
4 INJECTION INTRAMUSCULAR; INTRAVENOUS EVERY 6 HOURS PRN
Status: DISCONTINUED | OUTPATIENT
Start: 2022-11-20 | End: 2022-11-21

## 2022-11-20 RX ORDER — OMEGA-3 FATTY ACIDS/FISH OIL 300-1000MG
1 CAPSULE ORAL DAILY
COMMUNITY

## 2022-11-20 RX ORDER — NALOXONE HYDROCHLORIDE 0.4 MG/ML
0.4 INJECTION, SOLUTION INTRAMUSCULAR; INTRAVENOUS; SUBCUTANEOUS
Status: DISCONTINUED | OUTPATIENT
Start: 2022-11-20 | End: 2022-11-21 | Stop reason: HOSPADM

## 2022-11-20 RX ORDER — ROSUVASTATIN CALCIUM 20 MG/1
20 TABLET, COATED ORAL AT BEDTIME
Status: DISCONTINUED | OUTPATIENT
Start: 2022-11-20 | End: 2022-11-21 | Stop reason: DRUGHIGH

## 2022-11-20 RX ORDER — ONDANSETRON 4 MG/1
4 TABLET, ORALLY DISINTEGRATING ORAL EVERY 6 HOURS PRN
Status: DISCONTINUED | OUTPATIENT
Start: 2022-11-20 | End: 2022-11-21

## 2022-11-20 RX ORDER — IOPAMIDOL 755 MG/ML
68 INJECTION, SOLUTION INTRAVASCULAR ONCE
Status: COMPLETED | OUTPATIENT
Start: 2022-11-20 | End: 2022-11-20

## 2022-11-20 RX ORDER — CLOPIDOGREL BISULFATE 75 MG/1
300 TABLET ORAL ONCE
Status: COMPLETED | OUTPATIENT
Start: 2022-11-20 | End: 2022-11-20

## 2022-11-20 RX ORDER — ACETAMINOPHEN 325 MG/1
650 TABLET ORAL EVERY 4 HOURS PRN
Status: DISCONTINUED | OUTPATIENT
Start: 2022-11-20 | End: 2022-11-21

## 2022-11-20 RX ADMIN — ASPIRIN 325 MG ORAL TABLET 325 MG: 325 PILL ORAL at 18:47

## 2022-11-20 RX ADMIN — SODIUM CHLORIDE 100 ML: 9 INJECTION, SOLUTION INTRAVENOUS at 15:28

## 2022-11-20 RX ADMIN — ROSUVASTATIN CALCIUM 20 MG: 20 TABLET, FILM COATED ORAL at 22:14

## 2022-11-20 RX ADMIN — CLOPIDOGREL BISULFATE 300 MG: 75 TABLET ORAL at 18:47

## 2022-11-20 RX ADMIN — GADOBUTROL 5 ML: 604.72 INJECTION INTRAVENOUS at 16:05

## 2022-11-20 RX ADMIN — IOPAMIDOL 68 ML: 755 INJECTION, SOLUTION INTRAVENOUS at 15:28

## 2022-11-20 ASSESSMENT — ACTIVITIES OF DAILY LIVING (ADL)
ADLS_ACUITY_SCORE: 35
ADLS_ACUITY_SCORE: 21
HEARING_DIFFICULTY_OR_DEAF: NO
CHANGE_IN_FUNCTIONAL_STATUS_SINCE_ONSET_OF_CURRENT_ILLNESS/INJURY: NO
VISION_MANAGEMENT: READERS
TOILETING_ISSUES: NO
WEAR_GLASSES_OR_BLIND: YES
DOING_ERRANDS_INDEPENDENTLY_DIFFICULTY: NO
FALL_HISTORY_WITHIN_LAST_SIX_MONTHS: NO
DIFFICULTY_EATING/SWALLOWING: NO
ADLS_ACUITY_SCORE: 35
CONCENTRATING,_REMEMBERING_OR_MAKING_DECISIONS_DIFFICULTY: YES
DRESSING/BATHING_DIFFICULTY: NO
WALKING_OR_CLIMBING_STAIRS_DIFFICULTY: NO
ADLS_ACUITY_SCORE: 35
ADLS_ACUITY_SCORE: 35
DIFFICULTY_COMMUNICATING: NO

## 2022-11-20 NOTE — ED TRIAGE NOTES
Pt c/o 2 episodes today of right hand numbness and gait imbalance, first episode this AM at 0730, then again at 1100. Pt c/o pain of left side of neck is on rosuvastatin for that. S/s currently have resolved. BEFAST exam in triage is negative, negative arm drift.      Triage Assessment       Row Name 11/20/22 1984       Triage Assessment (Adult)    Airway WDL WDL       Respiratory WDL    Respiratory WDL WDL       Skin Circulation/Temperature WDL    Skin Circulation/Temperature WDL WDL       Cardiac WDL    Cardiac WDL WDL       Peripheral/Neurovascular WDL    Peripheral Neurovascular WDL WDL       Cognitive/Neuro/Behavioral WDL    Cognitive/Neuro/Behavioral WDL WDL

## 2022-11-20 NOTE — ED PROVIDER NOTES
History     Chief Complaint   Patient presents with     Numbness     Gait Disturbance     HPI  Clarita Lowry is a 72 year old female with history of hypertension on metoprolol, bilateral carotid stenosis on aspirin, hyperlipidemia on rosuvastatin, with approximate 1 month of blurriness and left eye and 2 episodes of gait instability and numbness and tingling in right hand.  Former smoker, quit in 2012.  Patient reports that she awoke this morning feeling well.  He got outside and came back and at approximately 645 noticed a numbness and tingling sensation in her right hand.  Shortly after she tried walking and could not walk in a straight line.  Was listing off toward the right.  The symptoms lasted for about 30 minutes.  She sat down on the couch to rest.  He was feeling well enough able to shower and get dressed and go to work.  Had a repeat episode similar to first but all lasted approximately 5 minutes.  At this point she contacted her  came to the emergency department.  No symptoms since that time.  She reports that she has a narrowing of her carotid arteries.  Had imaging done but does not remember exactly when.  No chest pain or shortness of breath.  No palpitations.  Was in her usual state of health when she awoke this morning.    The patient's PMHx, Surgical Hx, Allergies, and Medications were all reviewed with the patient.    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     Priority: Medium     HTN, goal below 140/90 04/02/2014     Priority: Medium     Hyperlipidemia LDL goal <160 04/26/2012     Priority: Medium     Chest pain 03/14/2012     Priority: Medium        Past Medical History:    Past Medical History:   Diagnosis Date     Hypertension        Past Surgical History:    Past Surgical History:   Procedure Laterality Date  "    COLONOSCOPY N/A 9/1/2015    Procedure: COLONOSCOPY;  Surgeon: Mayelin Suresh MD;  Location: [facility]     SURGICAL HISTORY OF -       cyst removal from left knee     SURGICAL HISTORY OF -       cryotherapy and laparoscopy - unclear reason why     TUBAL LIGATION  1982    Laparoscopic bilateral tubal ligation       Family History:    Family History   Problem Relation Age of Onset     Cerebrovascular Disease Sister 27     C.A.D. Father 63     Depression Mother      Musculoskeletal Disorder Brother         polio       Social History:  Marital Status:   [2]  Social History     Tobacco Use     Smoking status: Former     Packs/day: 1.00     Years: 35.00     Pack years: 35.00     Types: Cigarettes     Quit date: 3/13/2012     Years since quitting: 10.6     Smokeless tobacco: Never   Vaping Use     Vaping Use: Never used   Substance Use Topics     Alcohol use: Yes     Comment: few drinks a week     Drug use: No        Medications:    aspirin 81 MG tablet  Cholecalciferol (D3 ADULT PO)  metoprolol succinate ER (TOPROL-XL) 100 MG 24 hr tablet  metoprolol succinate ER (TOPROL-XL) 50 MG 24 hr tablet  MULTIPLE VITAMIN PO  omega 3 1000 MG CAPS  rosuvastatin (CRESTOR) 5 MG tablet          Review of Systems  A complete review of systems performed and is otherwise negative except as noted in the HPI    Physical Exam   BP: (!) 169/86  Pulse: 72  Temp: 97.4  F (36.3  C)  Resp: 10  Height: 162.6 cm (5' 4\")  Weight: 50.3 kg (111 lb)  SpO2: 95 %    Physical Exam  GEN: Awake, alert, and cooperative. No acute distress.  HENT: MMM. External ears and nose normal bilaterally.  EYES: EOM intact. Conjunctiva clear. No discharge.  No RAPD.  No nystagmus.  Visual fields intact to confrontation.  NECK: Symmetric, freely mobile.  Nontender.  Carotid bruit on left.  CV : Regular rate and rhythm.  Diastolic murmur?  PULM: Normal effort. No wheezes, rales, or rhonchi bilaterally.  ABD: Soft, non-tender, non-distended. No rebound or " guarding.   NEURO: Mental status: Alert, awake. Oriented to self, date, and place. Normal speech and language. GCS 15  Cranial Nerves: II-XII grossly intact  Motor: Follows commands x 4 extremities   Upper Extremities:   RUE: 5/5 shoulder abduction. 5/5 elbow flex/ext. 5/5 wrist flex/ext. 5/5 hand .   LUE: 5/5 shoulder abduction. 5/5 elbow flex/ext. 5/5 wrist flex/ext. 5/5 hand .    Lower Extremities:   RLE: 5/5 hip flexion. 5/5 knee flex/ext. 5/5 ankle plantar-/dorsiflexion. 5/5 EHL.   LLE: 5/5 hip flexion. 5/5 knee flex/ext. 5/5 ankle plantar-/dorsiflexion. 5/5 EHL   Sensory: Sensation intact in all 4 extremities   Coordination: Finger-nose finger intact. Heel-shin intact.   EXT: No gross deformity. Warm and well perfused.  INT: Warm. No diaphoresis. Normal color.        ED Course        Procedures         EKG: Interpreted by myself.  Sinus rhythm with rate 68 bpm. LBBB pattern. Left axis. Predominant S wave in anteroseptal leads. Dominant R wave in lateral leads. No prior ECG for comparison. Impression: sinus rhythm with LBBB    Critical Care time:  none               Results for orders placed or performed during the hospital encounter of 11/20/22 (from the past 24 hour(s))   Glucose by meter   Result Value Ref Range    GLUCOSE BY METER POCT 98 70 - 99 mg/dL   [place] Draw    Narrative    The following orders were created for panel order [place] Draw.  Procedure                               Abnormality         Status                     ---------                               -----------         ------                     Extra Blue Top Tube[[id number]]                              Final result               Extra Red Top Tube[[id number]]                               Final result               Extra Green Top (Lithium...[[id number]]                      Final result               Extra Purple Top Tube[[id number]]                            Final result                 Please view results for these tests on  the individual orders.   Extra Blue Top Tube   Result Value Ref Range    Hold Specimen JIC    Extra Red Top Tube   Result Value Ref Range    Hold Specimen JIC    Extra Green Top (Lithium Heparin) Tube   Result Value Ref Range    Hold Specimen JIC    Extra Purple Top Tube   Result Value Ref Range    Hold Specimen JIC    CBC with platelets differential    Narrative    The following orders were created for panel order CBC with platelets differential.  Procedure                               Abnormality         Status                     ---------                               -----------         ------                     CBC with platelets and d...[[id number]]                      Final result                 Please view results for these tests on the individual orders.   Comprehensive metabolic panel   Result Value Ref Range    Sodium 139 136 - 145 mmol/L    Potassium 3.9 3.4 - 5.3 mmol/L    Chloride 102 98 - 107 mmol/L    Carbon Dioxide (CO2) 29 22 - 29 mmol/L    Anion Gap 8 7 - 15 mmol/L    Urea Nitrogen 16.9 8.0 - 23.0 mg/dL    Creatinine 0.88 0.51 - 0.95 mg/dL    Calcium 9.7 8.8 - 10.2 mg/dL    Glucose 95 70 - 99 mg/dL    Alkaline Phosphatase 66 35 - 104 U/L    AST 29 10 - 35 U/L    ALT 21 10 - 35 U/L    Protein Total 7.4 6.4 - 8.3 g/dL    Albumin 4.7 3.5 - 5.2 g/dL    Bilirubin Total 0.3 <=1.2 mg/dL    GFR Estimate 70 >60 mL/min/1.73m2   INR   Result Value Ref Range    INR 0.97 0.85 - 1.15   Partial thromboplastin time   Result Value Ref Range    aPTT 26 22 - 38 Seconds   Hemoglobin A1c   Result Value Ref Range    Hemoglobin A1C 5.7 (H) <5.7 %   CBC with platelets and differential   Result Value Ref Range    WBC Count 7.7 4.0 - 11.0 10e3/uL    RBC Count 4.00 3.80 - 5.20 10e6/uL    Hemoglobin 12.5 11.7 - 15.7 g/dL    Hematocrit 37.2 35.0 - 47.0 %    MCV 93 78 - 100 fL    MCH 31.3 26.5 - 33.0 pg    MCHC 33.6 31.5 - 36.5 g/dL    RDW 11.9 10.0 - 15.0 %    Platelet Count 244 150 - 450 10e3/uL    % Neutrophils 54 %     % Lymphocytes 31 %    % Monocytes 10 %    % Eosinophils 4 %    % Basophils 1 %    % Immature Granulocytes 0 %    NRBCs per 100 WBC 0 <1 /100    Absolute Neutrophils 4.2 1.6 - 8.3 10e3/uL    Absolute Lymphocytes 2.4 0.8 - 5.3 10e3/uL    Absolute Monocytes 0.8 0.0 - 1.3 10e3/uL    Absolute Eosinophils 0.3 0.0 - 0.7 10e3/uL    Absolute Basophils 0.1 0.0 - 0.2 10e3/uL    Absolute Immature Granulocytes 0.0 <=0.4 10e3/uL    Absolute NRBCs 0.0 10e3/uL   UA with Microscopic reflex to Culture    Specimen: Urine, Midstream   Result Value Ref Range    Color Urine Straw Colorless, Straw, Light Yellow, Yellow    Appearance Urine Clear Clear    Glucose Urine Negative Negative mg/dL    Bilirubin Urine Negative Negative    Ketones Urine Negative Negative mg/dL    Specific Gravity Urine 1.006 1.003 - 1.035    Blood Urine Negative Negative    pH Urine 5.0 5.0 - 7.0    Protein Albumin Urine Negative Negative mg/dL    Urobilinogen Urine Normal Normal, 2.0 mg/dL    Nitrite Urine Negative Negative    Leukocyte Esterase Urine Negative Negative    RBC Urine 1 <=2 /HPF    WBC Urine 1 <=5 /HPF    Squamous Epithelials Urine <1 <=1 /HPF    Narrative    Urine Culture not indicated   Head CT w/o contrast    Narrative    EXAM: CT HEAD W/O CONTRAST  LOCATION: [facility]  DATE/TIME: 11/20/2022 3:37 PM    INDICATION: TIA   two episodes of right hand paresthesia and gait instability, 30 mins this AM and 5 min around noon, carotid bruit left, non focal neuro exam  COMPARISON: MRI 11/20/2022. CT 02/03/2022.  TECHNIQUE: Routine CT Head without IV contrast. Multiplanar reformats. Dose reduction techniques were used.    FINDINGS:  INTRACRANIAL CONTENTS: No intracranial hemorrhage, extraaxial collection, or mass effect.  No CT evidence of acute infarct. Mild presumed chronic small vessel ischemic changes. Age-appropriate ventricles and sulci. Extra-axial 2.2 cm mass lesion along   the lateral aspect of the right posterior fossa,  most likely representing a meningioma. This appears grossly stable from 02/03/2022, within the limitations of noncontrast technique. There is no associated edema.    VISUALIZED ORBITS/SINUSES/MASTOIDS: No intraorbital abnormality. No paranasal sinus mucosal disease. No middle ear or mastoid effusion.    BONES/SOFT TISSUES: No scalp hematoma. No skull fracture.      Impression    IMPRESSION:  1.  No evidence of acute infarction or other acute intracranial abnormality.  2.  Mild presumed chronic small vessel ischemic change.  3.  Extra-axial mass lesion along the right lateral aspect of the posterior fossa, favored to represent a noncalcified meningioma. This is grossly unchanged from 02/03/2022, allowing for differences in technique. This finding can be further characterized   by MRI.   CTA Head Neck with Contrast    Narrative    EXAM: CTA HEAD NECK W CONTRAST  LOCATION: [facility]  DATE/TIME: 11/20/2022 3:37 PM    INDICATION: TIA, two episodes of right hand paresthesia and gait instability, 30 mins this AM and 5 min around noon, carotid bruit left, non focal neuro exam.  COMPARISON: Carotid ultrasound 02/03/2022.  CONTRAST: 68 mL Isovue-370.  TECHNIQUE: Head and neck CT angiogram with IV contrast. Axial helical CT images of the head and neck vessels were obtained during the arterial phase of intravenous contrast administration. Axial 2D reconstructed images and multiplanar 3D MIP   reconstructed images of the head and neck vessels were performed by the technologist. Dose reduction techniques were used. All stenosis measurements made according to NASCET criteria unless otherwise specified.    FINDINGS:   HEAD CTA:  ANTERIOR CIRCULATION: The intracranial internal carotid and anterior cerebral arteries appear patent. The right and left middle cerebral arteries are without evidence of proximal occlusion or flow-limiting stenosis. No aneurysm or high flow vascular   malformation is  demonstrated.    POSTERIOR CIRCULATION: The diminutive right intradural vertebral artery decreases in caliber at the dural penetration. There is no clearly demonstrable confluence with the basilar artery. This may represent a developmental finding, though atherosclerotic   disease is not excluded. The dominant left vertebral artery appears patent. The basilar and visualized proximal cerebellar arteries are unremarkable. The posterior cerebral arteries demonstrate no proximal occlusion or flow-limiting stenosis. There is   fetal-type right PCA anatomy. No aneurysm or high flow vascular malformation is demonstrated.     DURAL VENOUS SINUSES: Expected enhancement of the major dural venous sinuses.    NECK CTA:  RIGHT CAROTID: Atherosclerotic plaque results in less than 50% stenosis in the right ICA. No dissection.    LEFT CAROTID: Atherosclerotic plaque results in less than 50% stenosis in the left ICA. No dissection.    VERTEBRAL ARTERIES: No focal stenosis or dissection. Dominant left and smaller right vertebral arteries.    AORTIC ARCH: There is a left-sided aortic arch. No flow-limiting stenosis is apparent at the origins of the brachiocephalic, common carotid, subclavian or vertebral arteries.    NONVASCULAR STRUCTURES: There are moderate emphysematous changes of the visualized lung apices. There is pleural-parenchymal scarring of the lung apices. There are moderate degenerative changes of the cervical spine. The soft tissues of the neck,   including the thyroid and parotid glands, are grossly unremarkable for technique.      Impression    IMPRESSION:   HEAD CTA:   1.  No definite proximal large vessel occlusion.  2.  The diminutive V4 segment does not clearly connect with the basilar artery. This may represent developmental variation, though atherosclerotic disease is not excluded.  3.  Otherwise, no evidence of flow-limiting stenosis.    NECK CTA:  1.  No evidence of hemodynamically significant carotid stenosis  based on NASCET criteria.  2.  No evidence for dissection or pseudoaneurysm.  3.  Pulmonary emphysema.   MR Brain w/o & w Contrast    Narrative    EXAM: MR BRAIN W/O and W CONTRAST  LOCATION: [facility]  DATE/TIME: 11/20/2022 4:06 PM    INDICATION: TIA symptoms with right hand paresthesia and gait imbalance  COMPARISON: CT 11/20/2022.  CONTRAST: 5 mL Gadavist.  TECHNIQUE: Routine multiplanar multisequence head MRI without and with intravenous contrast.    FINDINGS:  INTRACRANIAL CONTENTS: No acute or subacute infarct. No acute hemorrhage, or extra-axial fluid collections. Scattered nonspecific T2/FLAIR hyperintensities within the cerebral white matter most consistent with mild chronic microvascular ischemic change.   Age-appropriate ventricles and sulci. Low-lying cerebellar tonsils descending approximately 3 mm below the foramen magnum. No pathologic contrast enhancement. There is an avidly enhancing 2.0 x 1.6 x 1.6 cm extra-axial mass lesion along the right lateral   aspect of the posterior fossa. The appearance is most compatible with angioma. There is no evidence of edema brain parenchyma. Mild regional mass effect without midline shift or herniation. The major intracranial flow voids are unremarkable.    SELLA: No abnormality accounting for technique.    OSSEOUS STRUCTURES/SOFT TISSUES: Unremarkable marrow signal unremarkable extracranial soft tissues.     ORBITS: No abnormality accounting for technique.     SINUSES/MASTOIDS: Mild mucosal thickening scattered about the paranasal sinuses. No middle ear or mastoid effusion.       Impression    IMPRESSION:  1.  No evidence of acute infarction or other acute intracranial abnormality.  2.  Mild presumed chronic small vessel ischemic change.  3.  Enhancing 2.0 cm extra-axial mass lesion in the right posterior fossa, most compatible with meningioma. No associated edema  4.  mild cerebellar tonsillar ectopia.   Asymptomatic COVID-19 Virus  (Coronavirus) by PCR Nose    Specimen: Nose; Swab   Result Value Ref Range    SARS CoV2 PCR Negative Negative    Narrative    Testing was performed using the charo  SARS-CoV-2 & Influenza A/B Assay on the charo  Lisa  System.  This test should be ordered for the detection of SARS-COV-2 in individuals who meet SARS-CoV-2 clinical and/or epidemiological criteria. Test performance is unknown in asymptomatic patients.  This test is for in vitro diagnostic use under the FDA EUA for laboratories certified under CLIA to perform moderate and/or high complexity testing. This test has not been FDA cleared or approved.  A negative test does not rule out the presence of PCR inhibitors in the specimen or target RNA in concentration below the limit of detection for the assay. The possibility of a false negative should be considered if the patient's recent exposure or clinical presentation suggests COVID-19.  [facility] are certified under the Clinical Laboratory Improvement Amendments of 1988 (CLIA-88) as qualified to perform moderate and/or high complexity laboratory testing.       Medications   iopamidol (ISOVUE-370) solution 68 mL (68 mLs Intravenous Given 11/20/22 1528)   sodium chloride 0.9 % bag 500mL for CT scan flush use (100 mLs As instructed Given 11/20/22 1528)   gadobutrol (GADAVIST) injection 5 mL (5 mLs Intravenous Given 11/20/22 1605)   aspirin (ASA) tablet 325 mg (325 mg Oral Given 11/20/22 1847)   clopidogrel (PLAVIX) tablet 300 mg (300 mg Oral Given 11/20/22 1847)       Assessments & Plan (with Medical Decision Making)   72 year old female with past medical history of hypertension, hyperlipidemia, former tobacco user, with 2 episodes of right hand paresthesias and gait instability detailed above.  No symptoms on the emergency department.  Neurologic exam nonfocal.  NIH stroke scale 0.  Patient did not meet criteria for tiered activation.  Initial evaluation with basic labs, EKG, CTA of head  and CTA head and neck.    EKG with sinus rhythm with a bundle branch block pattern.  No prior EKG for comparison.  No history of proximal atrial fibrillation.  No chest pain, shortness of breath or palpitations.  CBC normal.  Point-of-care glucose 98.  CMP normal.  Urinalysis without evidence of acute infection.  SARS-CoV-2 testing negative.  Hemoglobin A1c 5.7.  PT/INR within normal limits.    CT scan of head with no evidence of acute intercranial pathology.  Extra-axial mass lesion along right lateral posterior fossa favored to represent noncalcified meningioma.  No significant change compared to MRI from 2/3/2022.  CTA of head without large vessel occlusion.  Diminutive V4 segment does not clearly connect with basilar artery this could represent developmental variation about atherosclerotic disease is not excluded.  No evidence of flow-limiting stenosis.  Neck CT a with bilateral atherosclerotic plaque less than 50% and bilateral carotid arteries and no evidence of dissection or pseudoaneurysm.    Case discussed with Dr. Chau with stroke neurology.  This is considered a dual TIA which is high risk.  Recommends full dose aspirin, Plavix load of 375 mg, increasing rosuvastatin from 5 mg to 20 mg.  Echocardiogram, lipid panel, and usual TIA work-up.  They will evaluate patient remotely tomorrow.  She was given full dose aspirin and Plavix in the ED.  Will order rosuvastatin for this evening.     All findings discussed with patient.  She is agreeable with plan for admission.  Case discussed Gela Irizarry the hospitalist service who excepted the admission.  Transition orders placed.      I have reviewed the nursing notes.         New Prescriptions    No medications on file       Final diagnoses:   TIA (transient ischemic attack)     Jj Spring MD    11/20/2022   [facility] EMERGENCY DEPT    Disclaimer: This note consists of words and symbols derived from keyboarding and dictation using  voice recognition software.  As a result, there may be errors that have gone undetected.  Please consider this when interpreting information found in this note.             Jj Spring MD  11/20/22 1935

## 2022-11-20 NOTE — ED NOTES
"Pt ambulated back to ER room without difficulty. Pt states that she has had intermittent \"blurriness in my left eye since October when my  was in the hospital, I thought it was just stress. So I go to the eye doctor Tuesday for that\".   Pt states that right hand tingling comes and goes. Not sure if she slept wrong last night. Denies neck pain. No injury/trauma.   "

## 2022-11-21 ENCOUNTER — APPOINTMENT (OUTPATIENT)
Dept: SPEECH THERAPY | Facility: CLINIC | Age: 72
End: 2022-11-21
Payer: COMMERCIAL

## 2022-11-21 ENCOUNTER — APPOINTMENT (OUTPATIENT)
Dept: CARDIOLOGY | Facility: CLINIC | Age: 72
End: 2022-11-21
Attending: INTERNAL MEDICINE
Payer: COMMERCIAL

## 2022-11-21 VITALS
RESPIRATION RATE: 16 BRPM | BODY MASS INDEX: 19.92 KG/M2 | HEIGHT: 63 IN | HEART RATE: 65 BPM | SYSTOLIC BLOOD PRESSURE: 166 MMHG | TEMPERATURE: 97.9 F | WEIGHT: 112.43 LBS | OXYGEN SATURATION: 99 % | DIASTOLIC BLOOD PRESSURE: 64 MMHG

## 2022-11-21 PROBLEM — G45.9 TIA (TRANSIENT ISCHEMIC ATTACK): Status: ACTIVE | Noted: 2022-11-21

## 2022-11-21 LAB
CHOLEST SERPL-MCNC: 146 MG/DL
CREAT SERPL-MCNC: 0.84 MG/DL (ref 0.51–0.95)
GFR SERPL CREATININE-BSD FRML MDRD: 74 ML/MIN/1.73M2
GLUCOSE BLDC GLUCOMTR-MCNC: 109 MG/DL (ref 70–99)
GLUCOSE BLDC GLUCOMTR-MCNC: 118 MG/DL (ref 70–99)
HDLC SERPL-MCNC: 69 MG/DL
LDLC SERPL CALC-MCNC: 49 MG/DL
LVEF ECHO: NORMAL
NONHDLC SERPL-MCNC: 77 MG/DL
TRIGL SERPL-MCNC: 139 MG/DL

## 2022-11-21 PROCEDURE — 999N000147 HC STATISTIC PT IP EVAL DEFER

## 2022-11-21 PROCEDURE — 92610 EVALUATE SWALLOWING FUNCTION: CPT | Mod: GN | Performed by: SPEECH-LANGUAGE PATHOLOGIST

## 2022-11-21 PROCEDURE — 96372 THER/PROPH/DIAG INJ SC/IM: CPT | Mod: XU | Performed by: INTERNAL MEDICINE

## 2022-11-21 PROCEDURE — 99207 PR NOT IN PERSON INPATIENT CONSULT STATISTICAL MARKER: CPT | Performed by: INTERNAL MEDICINE

## 2022-11-21 PROCEDURE — 250N000011 HC RX IP 250 OP 636: Performed by: INTERNAL MEDICINE

## 2022-11-21 PROCEDURE — 255N000002 HC RX 255 OP 636: Performed by: HOSPITALIST

## 2022-11-21 PROCEDURE — 93306 TTE W/DOPPLER COMPLETE: CPT | Mod: 26 | Performed by: INTERNAL MEDICINE

## 2022-11-21 PROCEDURE — 82962 GLUCOSE BLOOD TEST: CPT

## 2022-11-21 PROCEDURE — 82565 ASSAY OF CREATININE: CPT | Performed by: INTERNAL MEDICINE

## 2022-11-21 PROCEDURE — 99234 HOSP IP/OBS SM DT SF/LOW 45: CPT | Mod: 95 | Performed by: INTERNAL MEDICINE

## 2022-11-21 PROCEDURE — 999N000208 ECHOCARDIOGRAM COMPLETE

## 2022-11-21 PROCEDURE — G0378 HOSPITAL OBSERVATION PER HR: HCPCS

## 2022-11-21 PROCEDURE — 250N000013 HC RX MED GY IP 250 OP 250 PS 637: Performed by: INTERNAL MEDICINE

## 2022-11-21 PROCEDURE — 36415 COLL VENOUS BLD VENIPUNCTURE: CPT | Performed by: INTERNAL MEDICINE

## 2022-11-21 PROCEDURE — 99204 OFFICE O/P NEW MOD 45 MIN: CPT | Mod: G0 | Performed by: NURSE PRACTITIONER

## 2022-11-21 PROCEDURE — 80061 LIPID PANEL: CPT | Performed by: INTERNAL MEDICINE

## 2022-11-21 RX ORDER — VITAMIN B COMPLEX
25 TABLET ORAL DAILY
Status: DISCONTINUED | OUTPATIENT
Start: 2022-11-21 | End: 2022-11-21 | Stop reason: HOSPADM

## 2022-11-21 RX ORDER — ENOXAPARIN SODIUM 100 MG/ML
40 INJECTION SUBCUTANEOUS EVERY 24 HOURS
Status: DISCONTINUED | OUTPATIENT
Start: 2022-11-21 | End: 2022-11-21 | Stop reason: HOSPADM

## 2022-11-21 RX ORDER — CLOPIDOGREL BISULFATE 75 MG/1
75 TABLET ORAL DAILY
Status: DISCONTINUED | OUTPATIENT
Start: 2022-11-21 | End: 2022-11-21 | Stop reason: HOSPADM

## 2022-11-21 RX ORDER — ASPIRIN 81 MG/1
81 TABLET ORAL DAILY
Status: DISCONTINUED | OUTPATIENT
Start: 2022-11-22 | End: 2022-11-21 | Stop reason: HOSPADM

## 2022-11-21 RX ORDER — ACETAMINOPHEN 325 MG/1
650 TABLET ORAL EVERY 4 HOURS PRN
Status: DISCONTINUED | OUTPATIENT
Start: 2022-11-21 | End: 2022-11-21 | Stop reason: HOSPADM

## 2022-11-21 RX ORDER — CLOPIDOGREL BISULFATE 75 MG/1
75 TABLET ORAL DAILY
Status: DISCONTINUED | OUTPATIENT
Start: 2022-11-21 | End: 2022-11-21

## 2022-11-21 RX ORDER — MULTIVITAMIN,THERAPEUTIC
1 TABLET ORAL DAILY
Status: DISCONTINUED | OUTPATIENT
Start: 2022-11-21 | End: 2022-11-21 | Stop reason: HOSPADM

## 2022-11-21 RX ORDER — ROSUVASTATIN CALCIUM 20 MG/1
40 TABLET, COATED ORAL DAILY
Status: DISCONTINUED | OUTPATIENT
Start: 2022-11-21 | End: 2022-11-21

## 2022-11-21 RX ORDER — ROSUVASTATIN CALCIUM 20 MG/1
40 TABLET, COATED ORAL AT BEDTIME
Status: DISCONTINUED | OUTPATIENT
Start: 2022-11-22 | End: 2022-11-21 | Stop reason: HOSPADM

## 2022-11-21 RX ORDER — ASPIRIN 300 MG/1
300 SUPPOSITORY RECTAL DAILY
Status: DISCONTINUED | OUTPATIENT
Start: 2022-11-21 | End: 2022-11-21

## 2022-11-21 RX ORDER — LIDOCAINE 40 MG/G
CREAM TOPICAL
Status: DISCONTINUED | OUTPATIENT
Start: 2022-11-21 | End: 2022-11-21 | Stop reason: HOSPADM

## 2022-11-21 RX ORDER — ASPIRIN 81 MG/1
324 TABLET, CHEWABLE ORAL DAILY
Status: DISCONTINUED | OUTPATIENT
Start: 2022-11-21 | End: 2022-11-21

## 2022-11-21 RX ORDER — ONDANSETRON 4 MG/1
4 TABLET, ORALLY DISINTEGRATING ORAL EVERY 6 HOURS PRN
Status: DISCONTINUED | OUTPATIENT
Start: 2022-11-21 | End: 2022-11-21 | Stop reason: HOSPADM

## 2022-11-21 RX ORDER — ONDANSETRON 2 MG/ML
4 INJECTION INTRAMUSCULAR; INTRAVENOUS EVERY 6 HOURS PRN
Status: DISCONTINUED | OUTPATIENT
Start: 2022-11-21 | End: 2022-11-21 | Stop reason: HOSPADM

## 2022-11-21 RX ORDER — CLOPIDOGREL BISULFATE 75 MG/1
75 TABLET ORAL DAILY
Qty: 89 TABLET | Refills: 0 | Status: SHIPPED | OUTPATIENT
Start: 2022-11-22 | End: 2023-10-12

## 2022-11-21 RX ADMIN — ASPIRIN 325 MG: 325 TABLET, COATED ORAL at 08:41

## 2022-11-21 RX ADMIN — ENOXAPARIN SODIUM 40 MG: 100 INJECTION SUBCUTANEOUS at 01:49

## 2022-11-21 RX ADMIN — HUMAN ALBUMIN MICROSPHERES AND PERFLUTREN 2 ML: 10; .22 INJECTION, SOLUTION INTRAVENOUS at 08:31

## 2022-11-21 RX ADMIN — METOPROLOL SUCCINATE 150 MG: 100 TABLET, EXTENDED RELEASE ORAL at 08:41

## 2022-11-21 RX ADMIN — MULTIVITAMIN TABLET 1 TABLET: TABLET at 08:42

## 2022-11-21 RX ADMIN — Medication 25 MCG: at 08:41

## 2022-11-21 RX ADMIN — CLOPIDOGREL BISULFATE 75 MG: 75 TABLET ORAL at 08:41

## 2022-11-21 ASSESSMENT — ACTIVITIES OF DAILY LIVING (ADL)
ADLS_ACUITY_SCORE: 21

## 2022-11-21 ASSESSMENT — CHADS2 SCORE
CHF: NO
PRIOR STROKE OR TIA OR THROMBOEMBOLISM: NO
HYPERTENSION: YES
HYPERTENSION: YES
DIABETES: NO
DIABETES: NO
CHF: NO
CHADS2 SCORE: 1
AGE GREATER THAN OR EQUAL TO 75: NO
PRIOR STROKE OR TIA OR THROMBOEMBOLISM: NO
AGE GREATER THAN OR EQUAL TO 75: NO
CHADS2 SCORE: 1

## 2022-11-21 NOTE — CONSULTS
[facility]    Stroke Telephone Note    I was called by Jj Bourne on 11/20/22 regarding patient Clarita Lowry. The patient is a 72 year old female with HTN and HLD who woke up fine this morning then half an hour later had an episode of right UE tingling/numbness and unsteady gait that lasted 30 min. After the episode subsided she went to work. Then around noon time she had a similar spell that lasted 5 minutes. She subsequently presented to the ED.    Imaging Findings   MRI brain: no acute pathology, diffuse atrophy, chronic small vessel white matter changes, small meningioma over the right cerebellum.   CT head: no acute pathology   CTA: mild stenosis bilat ICA more on the left side with associated calcification and possible soft plaque. The left ICA also has calcification in its petrous, cavernous, and supraclinoid portions but without stenosis.     Impression  Transient ischemic attack  Could be from the mild left cervical ICA stenosis.       Recommendations   - Neurochecks and Vital Signs every 4h   - Daily aspirin 325 mg for secondary stroke prevention  - Plavix (clopidogrel) 300 mg PO loading dose x 1  - Plavix (clopidogrel) 75 mg PO Daily  - Statin: crestor 20  - BP goal < 160/100  - MRI Brain with and without contrast  - TTE (with Bubble Study if age 60 yrs or less)  - 24-hour Telemetry  - Bedside Glucose Monitoring  - A1c, Lipid Panel  - PT/OT/SLP  - Stroke Education  - Euthermia, Euglycemia    My recommendations are based on the information provided over the phone by Clarita Lowry's in-person providers. They are not intended to replace the clinical judgment of her in-person providers. I was not requested to personally see or examine the patient at this time.      Rhea Chau MD, Msc, FAHA, FAAN   of Neurology  [facility]     11/20/2022 6:22 PM  To page me or covering stroke neurology team member, click here:  "AMCOM  Choose \"On Call\" tab at top, then search dropdown box for \"Neurology Adult\" & press Enter, look for Neuro ICU/Stroke           "

## 2022-11-21 NOTE — PHARMACY
Pharmacy Consult to evaluate for medication related stroke core measures    Clarita MAUREEN Lowry, 72 year old female admitted for TIA on 11/20/2022.    Thrombolytic was not given because of improvement of symptoms    VTE Prophylaxis Lovenox started 11/21/22    Antithrombotic: clopidogrel and aspirin started on 11/21/22, as appropriate by end of hospital day 2. Continue antithrombotic therapy on discharge to meet quality measures, unless contraindicated.    Anticoagulation if history of A-fib/flutter: Patient does not have history of A-fib/flutter - anticoagulation not required for medication related stroke core measures.     LDL Cholesterol Calculated   Date Value Ref Range Status   11/21/2022 49 <=100 mg/dL Final   08/25/2020 96 <100 mg/dL Final     Comment:     Desirable:       <100 mg/dl       Patient currently receiving Crestor (rosuvastatin) continue statin on discharge to meet quality measures, unless contraindicated.    Recommendations: None at this time    Thank you for the consult.    Miley Vivar [facility] 11/21/2022 1:20 PM

## 2022-11-21 NOTE — PLAN OF CARE
Goal Outcome Evaluation:      Neuro: A&O x4  Cardiac: WNL, on tele  Respiratory: WNL  GI/: WNL  Diet/appetite: Regular  Activity: Independent  Pain: Denies  Skin: Scab on L knee  LDA's:  IV saline locked     Other: Echocardiogram and neurology consult completed this shift     Plan: Possible discharge today

## 2022-11-21 NOTE — PROGRESS NOTES
Speech Therapy Evaluation  Bedside Swallow Evaluation  11/21/22    Appointment Info   Signing Clinician's Name / Credentials (SLP) Pam Hagen MA, CCC/SLP   General Information   Onset of Illness/Injury or Date of Surgery 11/20/22   Referring Physician Alonso Villa   Patient/Family Therapy Goal Statement (SLP) Pt has no concerns for speech, langauge, or swallowing   Pertinent History of Current Problem Clarita Lowry is a 72 year old female admitted on 11/20/2022. She presents with a possible TIA.  Per H&P: 72 year old female with past medical history of hypertension, hyperlipidemia, former tobacco user, with 2 episodes of right hand paresthesias and gait instability detailed above.  No symptoms on the emergency department.  Neurologic exam nonfocal.  NIH stroke scale 0.  Patient did not meet criteria for tiered activation.  Initial evaluation with basic labs, EKG, CTA of head and CTA head and neck.  Pt referred for ST evaluation.   Pain Assessment   Patient Currently in Pain No   Type of Evaluation   Type of Evaluation Swallow Evaluation   Oral Motor   Oral Musculature generally intact   Dentition (Oral Motor)   Dentition (Oral Motor) adequate dentition   Facial Symmetry (Oral Motor)   Facial Symmetry (Oral Motor) WNL   Lip Function (Oral Motor)   Lip Range of Motion (Oral Motor) WNL   Tongue Function (Oral Motor)   Comment, Tongue Function (Oral Motor) WNL   General Swallowing Observations   Current Diet/Method of Nutritional Intake (General Swallowing Observations, NIS) regular diet   Swallowing Evaluation Clinical swallow evaluation   Clinical Swallow Evaluation   Feeding Assistance no assistance needed   Clinical Swallow Evaluation Textures Trialed thin liquids;solid foods   Clinical Swallow Eval: Thin Liquid Texture Trial   Mode of Presentation, Thin Liquids self-fed   Volume of Liquid or Food Presented consecutive sips   Oral Phase of Swallow WFL   Pharyngeal Phase of Swallow intact   Diagnostic  Statement WNL   Clinical Swallow Evaluation: Solid Food Texture Trial   Mode of Presentation self-fed   Volume Presented single bites   Oral Phase WFL   Pharyngeal Phase intact   Diagnostic Statement WNL   Esophageal Phase of Swallow   Patient reports or presents with symptoms of esophageal dysphagia No   Swallowing Recommendations   Diet Consistency Recommendations regular diet   Supervision Level for Intake patient independent   Comment, Swallowing Recommendations Oral and pharyngeal stages of swallow are WNL for a regular diet consistency.  No deficits noted with informal testing of speech/language skills.  No treatment indiated.   Clinical Impression   Criteria for Skilled Therapeutic Interventions Met (SLP Eval) No problems identified which require skilled intervention   SLP Total Evaluation Time   Eval: oral/pharyngeal swallow function, clinical swallow Minutes (48904) 10   SLP Discharge Planning   SLP Brief overview of current status  No ST needs   Total Session Time   Total Session Time (sum of timed and untimed services) 10

## 2022-11-21 NOTE — PLAN OF CARE
"[facility] [facility] ADMISSION NOTE    Patient admitted to room 2310 at approximately 2127 via cart from emergency room. Patient was accompanied by transport tech.     Verbal SBAR report received from Amaris ED RN prior to patient arrival.     Patient ambulated to bed independently. Patient alert and oriented X 3. The patient is not having any pain.  . Admission vital signs: Blood pressure (!) 169/60, pulse 69, temperature 97.4  F (36.3  C), temperature source Tympanic, resp. rate 14, height 1.6 m (5' 3\"), weight 51 kg (112 lb 7 oz), SpO2 99 %, not currently breastfeeding. Patient was oriented to plan of care, call light, bed controls, tv, telephone, bathroom, and visiting hours.     Risk Assessment    The following safety risks were identified during admission: none. Yellow risk band applied: NO.     Skin Initial Assessment    This writer admitted this patient and completed a full skin assessment and Seth score in the Adult PCS flowsheet. Appropriate interventions initiated as needed.     Secondary skin check completed by CHRIS Christie.         Education    Patient has a [place] to Observation order: Yes  Observation education completed and documented: Yes      YUSUF REYES RN      "

## 2022-11-21 NOTE — H&P
[facility]    History and Physical - Hospitalist Service       Date of Admission:  11/20/2022    Assessment & Plan      Clarita Lowry is a 72 year old female admitted on 11/20/2022. She presents with a possible TIA.    1. Right upper extremity numbness      Ataxia  The patient presented to the ED with two episodes with numbness in her right hand and some ataxia. Her CT head and MRI brain are negative for CVA.   - observation status  - neurology consulted  - echocardiogram  - increased aspirin to 325mg PO daily  - started on clopidogrel  - neuro checks  - c/w rosuvastatin 20mg PO daily  - BP goal <160/100mmHg  - PT/OT/SLP    2. Hypertension  - c/w metoprolol    3. Hyperlipidemia  - c/w rosuvastatin         Diet: NPO for Medical/Clinical Reasons Except for: No Exceptions    DVT Prophylaxis: Enoxaparin (Lovenox) SQ  Neal Catheter: Not present  Central Lines: None  Code Status: Full Code      Clinically Significant Risk Factors Present on Admission                              Disposition Plan      Expected Discharge Date: 11/21/2022                The patient's care was discussed with the Patient.        Alonso Villa  [facility]  Securely message with the Vocera Web Console (learn more here)  Text page via [employer] Paging/Directory      Visit/Communication Style   Virtual (Video) communication was used to evaluate Clarita.  Clarita consented to the use of video communication: yes  Video START time: 0400, 11/21/2022  Video STOP time: 0430, 11/21/2022   Patient's location: [facility]   Provider's location during the visit: [facility] Tele-medicine site        ______________________________________________________________________    Chief Complaint   Right upper extremity numbness and ataxia    History is obtained from the patient    History of Present Illness   Clarita Lowry is a 72 year old female who presented to the ED with two  episodes of right upper extremity numbness and ataxia. She states that the first episode occurred yesterday at 730am and lasted about 30 minutes. Her hand was numb on the palmar aspect involving all the fingers and it did not extend beyond the wrist. She felt like she was falling to the right side when she walked but denied any vertigo or weakness on the right side. She has had some blurry vision on the left side for several months but denies any double vision or blindness. After she recovered she went to work and was doing well until 1200 and she had the exact same symptoms returned again for about 10 minutes. She decided to come to the ED for evaluation. She is currently symptom free.     Review of Systems    General: negative for fever, chills, sweats, weakness  Eyes: negative for blurred vision, loss of vision  Ear Nose and Throat: negative for pharyngitis, speech or swallowing difficulties  Respiratory:  negative for sputum production, wheezing, KENNY, pleuritic pain, sob or cough  Cardiology:  negative for chest pain, palpitations, orthopnea, PND, edema, syncope   Gastrointestinal: negative for abdominal pain, nausea, vomiting, diarrhea, constipation, hematemesis, melena or hematochezia  Genitourinary: negative for frequency, urgency, dysuria, hematuria   Neurological: paresthesia in the right hand and ataxia    Past Medical History    I have reviewed this patient's medical history and updated it with pertinent information if needed.   Past Medical History:   Diagnosis Date     Hyperlipidemia LDL goal <100      Hypertension        Past Surgical History   I have reviewed this patient's surgical history and updated it with pertinent information if needed.  Past Surgical History:   Procedure Laterality Date     COLONOSCOPY N/A 9/1/2015    Procedure: COLONOSCOPY;  Surgeon: Mayelin Suresh MD;  Location: [facility]     SURGICAL HISTORY OF -       cyst removal from left knee     SURGICAL HISTORY OF -        cryotherapy and laparoscopy - unclear reason why     TUBAL LIGATION  1982    Laparoscopic bilateral tubal ligation       Social History   I have reviewed this patient's social history and updated it with pertinent information if needed.  Social History     Tobacco Use     Smoking status: Former     Packs/day: 1.00     Years: 35.00     Pack years: 35.00     Types: Cigarettes     Quit date: 3/13/2012     Years since quitting: 10.6     Smokeless tobacco: Never   Vaping Use     Vaping Use: Never used   Substance Use Topics     Alcohol use: Yes     Comment: few drinks a week     Drug use: No       Family History   I have reviewed this patient's family history and updated it with pertinent information if needed.  Family History   Problem Relation Age of Onset     Cerebrovascular Disease Sister 27     C.A.D. Father 63     Depression Mother      Musculoskeletal Disorder Brother         polio       Prior to Admission Medications   Prior to Admission Medications   Prescriptions Last Dose Informant Patient Reported? Taking?   Cholecalciferol (D3 ADULT PO) 11/20/2022 at am Self Yes Yes   MULTIPLE VITAMIN PO 11/20/2022 at am Self Yes Yes   aspirin 81 MG tablet 11/20/2022 at am Self Yes Yes   Sig: Take by mouth daily   metoprolol succinate ER (TOPROL-XL) 100 MG 24 hr tablet 11/20/2022 at am Self No Yes   Sig: Take 1 tablet (100 mg) by mouth daily   Patient taking differently: Take 100 mg by mouth daily In addition to 50mg equalling 150mg daily; confirmed with patient   metoprolol succinate ER (TOPROL-XL) 50 MG 24 hr tablet 11/20/2022 at am Self No Yes   Sig: TAKE 1 TABLET BY MOUTH ONCE DAILY ADD  TO  100  MG   YOU  ARE  TAKING  NOW   Patient taking differently: In addition to 100mg equalling 150mg daily; confirmed with patient   omega 3 1000 MG CAPS 11/20/2022 at am Self Yes Yes   Sig: Take 1 capsule by mouth daily   rosuvastatin (CRESTOR) 5 MG tablet 11/20/2022 at am Self No Yes   Sig: Take 1 tablet (5 mg) by mouth daily       Facility-Administered Medications: None     Allergies   No Known Allergies    Physical Exam   Vital Signs: Temp: 97.7  F (36.5  C) Temp src: Oral BP: 137/44 Pulse: 68   Resp: 16 SpO2: 97 % O2 Device: None (Room air)    Weight: 112 lbs 6.95 oz    Gen:  Well-developed, well-nourished, in no acute distress, lying semi-supine in hospital stretcher  HEENT:  Anicteric sclera, PER, hearing intact to voice  Resp:  No accessory muscle use, breath sounds clear; no wheezes no rales no rhonchi  Card:  No murmur, normal S1, S2   Abd:  Soft per RN exam, no TTP, non-distended, normoactive bowel sounds are present  Musc:  Normal strength and movement of the major muscle groups without obvious deformity  Psych:  Good insight, oriented to person, place and time, not anxious, not agitated    Data     Recent Labs   Lab 11/20/22  1318 11/20/22  1314   WBC 7.7  --    HGB 12.5  --    MCV 93  --      --    INR 0.97  --      --    POTASSIUM 3.9  --    CHLORIDE 102  --    CO2 29  --    BUN 16.9  --    CR 0.88  --    ANIONGAP 8  --    TETO 9.7  --    GLC 95 98   ALBUMIN 4.7  --    PROTTOTAL 7.4  --    BILITOTAL 0.3  --    ALKPHOS 66  --    ALT 21  --    AST 29  --          Recent Results (from the past 24 hour(s))   Head CT w/o contrast    Narrative    EXAM: CT HEAD W/O CONTRAST  LOCATION: [facility]  DATE/TIME: 11/20/2022 3:37 PM    INDICATION: TIA   two episodes of right hand paresthesia and gait instability, 30 mins this AM and 5 min around noon, carotid bruit left, non focal neuro exam  COMPARISON: MRI 11/20/2022. CT 02/03/2022.  TECHNIQUE: Routine CT Head without IV contrast. Multiplanar reformats. Dose reduction techniques were used.    FINDINGS:  INTRACRANIAL CONTENTS: No intracranial hemorrhage, extraaxial collection, or mass effect.  No CT evidence of acute infarct. Mild presumed chronic small vessel ischemic changes. Age-appropriate ventricles and sulci. Extra-axial 2.2 cm mass lesion along    the lateral aspect of the right posterior fossa, most likely representing a meningioma. This appears grossly stable from 02/03/2022, within the limitations of noncontrast technique. There is no associated edema.    VISUALIZED ORBITS/SINUSES/MASTOIDS: No intraorbital abnormality. No paranasal sinus mucosal disease. No middle ear or mastoid effusion.    BONES/SOFT TISSUES: No scalp hematoma. No skull fracture.      Impression    IMPRESSION:  1.  No evidence of acute infarction or other acute intracranial abnormality.  2.  Mild presumed chronic small vessel ischemic change.  3.  Extra-axial mass lesion along the right lateral aspect of the posterior fossa, favored to represent a noncalcified meningioma. This is grossly unchanged from 02/03/2022, allowing for differences in technique. This finding can be further characterized   by MRI.   CTA Head Neck with Contrast    Narrative    EXAM: CTA HEAD NECK W CONTRAST  LOCATION: [facility]  DATE/TIME: 11/20/2022 3:37 PM    INDICATION: TIA, two episodes of right hand paresthesia and gait instability, 30 mins this AM and 5 min around noon, carotid bruit left, non focal neuro exam.  COMPARISON: Carotid ultrasound 02/03/2022.  CONTRAST: 68 mL [id number].  TECHNIQUE: Head and neck CT angiogram with IV contrast. Axial helical CT images of the head and neck vessels were obtained during the arterial phase of intravenous contrast administration. Axial 2D reconstructed images and multiplanar 3D MIP   reconstructed images of the head and neck vessels were performed by the technologist. Dose reduction techniques were used. All stenosis measurements made according to NASCET criteria unless otherwise specified.    FINDINGS:   HEAD CTA:  ANTERIOR CIRCULATION: The intracranial internal carotid and anterior cerebral arteries appear patent. The right and left middle cerebral arteries are without evidence of proximal occlusion or flow-limiting stenosis. No aneurysm  or high flow vascular   malformation is demonstrated.    POSTERIOR CIRCULATION: The diminutive right intradural vertebral artery decreases in caliber at the dural penetration. There is no clearly demonstrable confluence with the basilar artery. This may represent a developmental finding, though atherosclerotic   disease is not excluded. The dominant left vertebral artery appears patent. The basilar and visualized proximal cerebellar arteries are unremarkable. The posterior cerebral arteries demonstrate no proximal occlusion or flow-limiting stenosis. There is   fetal-type right PCA anatomy. No aneurysm or high flow vascular malformation is demonstrated.     DURAL VENOUS SINUSES: Expected enhancement of the major dural venous sinuses.    NECK CTA:  RIGHT CAROTID: Atherosclerotic plaque results in less than 50% stenosis in the right ICA. No dissection.    LEFT CAROTID: Atherosclerotic plaque results in less than 50% stenosis in the left ICA. No dissection.    VERTEBRAL ARTERIES: No focal stenosis or dissection. Dominant left and smaller right vertebral arteries.    AORTIC ARCH: There is a left-sided aortic arch. No flow-limiting stenosis is apparent at the origins of the brachiocephalic, common carotid, subclavian or vertebral arteries.    NONVASCULAR STRUCTURES: There are moderate emphysematous changes of the visualized lung apices. There is pleural-parenchymal scarring of the lung apices. There are moderate degenerative changes of the cervical spine. The soft tissues of the neck,   including the thyroid and parotid glands, are grossly unremarkable for technique.      Impression    IMPRESSION:   HEAD CTA:   1.  No definite proximal large vessel occlusion.  2.  The diminutive V4 segment does not clearly connect with the basilar artery. This may represent developmental variation, though atherosclerotic disease is not excluded.  3.  Otherwise, no evidence of flow-limiting stenosis.    NECK CTA:  1.  No evidence of  hemodynamically significant carotid stenosis based on NASCET criteria.  2.  No evidence for dissection or pseudoaneurysm.  3.  Pulmonary emphysema.   MR Brain w/o & w Contrast    Narrative    EXAM: MR BRAIN W/O and W CONTRAST  LOCATION: [facility]  DATE/TIME: 11/20/2022 4:06 PM    INDICATION: TIA symptoms with right hand paresthesia and gait imbalance  COMPARISON: CT 11/20/2022.  CONTRAST: 5 mL Gadavist.  TECHNIQUE: Routine multiplanar multisequence head MRI without and with intravenous contrast.    FINDINGS:  INTRACRANIAL CONTENTS: No acute or subacute infarct. No acute hemorrhage, or extra-axial fluid collections. Scattered nonspecific T2/FLAIR hyperintensities within the cerebral white matter most consistent with mild chronic microvascular ischemic change.   Age-appropriate ventricles and sulci. Low-lying cerebellar tonsils descending approximately 3 mm below the foramen magnum. No pathologic contrast enhancement. There is an avidly enhancing 2.0 x 1.6 x 1.6 cm extra-axial mass lesion along the right lateral   aspect of the posterior fossa. The appearance is most compatible with angioma. There is no evidence of edema brain parenchyma. Mild regional mass effect without midline shift or herniation. The major intracranial flow voids are unremarkable.    SELLA: No abnormality accounting for technique.    OSSEOUS STRUCTURES/SOFT TISSUES: Unremarkable marrow signal unremarkable extracranial soft tissues.     ORBITS: No abnormality accounting for technique.     SINUSES/MASTOIDS: Mild mucosal thickening scattered about the paranasal sinuses. No middle ear or mastoid effusion.       Impression    IMPRESSION:  1.  No evidence of acute infarction or other acute intracranial abnormality.  2.  Mild presumed chronic small vessel ischemic change.  3.  Enhancing 2.0 cm extra-axial mass lesion in the right posterior fossa, most compatible with meningioma. No associated edema  4.  mild cerebellar  tonsillar ectopia.

## 2022-11-21 NOTE — DISCHARGE SUMMARY
[facility]  Hospitalist Discharge Summary      Date of Admission:  11/20/2022  Date of Discharge:  11/21/2022  Discharging Provider: LANA BRAUN CNP  Discharge Service: Hospitalist Service    Discharge Diagnoses   TIA     Follow-ups Needed After Discharge   Follow up with PCP in 7-10 days.   Recommend follow up regarding BP control as Systolic BP has been >140 though <160 since admission. Would consider addition of 5mg Amlodipine if home BP remains elevated.     Unresulted Labs Ordered in the Past 30 Days of this Admission     No orders found for last 31 day(s).          Discharge Disposition   Discharged to home  Condition at discharge: Stable     Hospital Course      Clarita Lowry is a 72 year old female admitted on 11/20/2022. Presented to the ED after the second episode of Right hand numbness with some ataxia. Symptoms had resolved prior to arrival at ED and have not recurred since. [id number] score of 1 recommendation of anticoagulation is indicated.  Clarita was started on Clopidogrel, receiving 375mg initially and 75mg daily, Rosuvastatin was increased to 40mg daily since reduced to home dose of 5mg.  Systolic BP has remained >140 though <160, in the environment post TIA allowing BP to 160/100 for the next few days. Patient was advised to take and record her BP twice daily until follow up PCP appointment.    Outpatient Holter monitor (30 day) recommended by neurology.  Hgb A1c of 5.7 is below secondary stroke prevention goal, recommend PCP follow up.    Recommend Neurology follow up in 6-8 weeks    1. Right upper extremity numbness      Ataxia  The patient presented to the ED with two episodes with numbness in her right hand and some ataxia. Her CT head and MRI brain are negative for CVA.   - observation status  - neurology consulted  - echocardiogram  - continue aspirin to 81mg PO daily  - started on 75mg clopidogrel daily  - neuro checks  - c/w rosuvastatin 5mg  - BP  goal <130/80  - PT/OT/SLP    2. Hypertension  - c/w metoprolol 150mg      3. Hyperlipidemia  - c/w rosuvastatin 5mg.  - Lipid panel within normal limits        Consultations This Hospital Stay   SPEECH LANGUAGE PATH ADULT IP CONSULT  PHARMACY IP CONSULT  PHARMACY IP CONSULT  PHARMACY IP CONSULT  PHYSICAL THERAPY ADULT IP CONSULT  OCCUPATIONAL THERAPY ADULT IP CONSULT  REHAB ADMISSIONS LIAISON IP CONSULT  CARE MANAGEMENT / SOCIAL WORK IP CONSULT  SMOKING CESSATION PROGRAM IP CONSULT    Code Status   Full Code    Time Spent on this Encounter   I, LANA BRAUN CNP, personally saw the patient today and spent greater than 30 minutes discharging this patient.       LANA BRAUN CNP  [facility] SURGICAL  [address] [address]  Phone: [phone]  Fax: [phone]  ______________________________________________________________________    Physical Exam   Vital Signs: Temp: 97.9  F (36.6  C) Temp src: Oral BP: (!) 166/64 Pulse: 65   Resp: 16 SpO2: 99 % O2 Device: None (Room air)    Weight: 112 lbs 6.95 oz   Physical Exam  Constitutional:       Appearance: Normal appearance.   HENT:      Head: Normocephalic and atraumatic.      Nose: Nose normal.      Mouth/Throat:      Mouth: Mucous membranes are moist.      Pharynx: Oropharynx is clear.   Eyes:      Extraocular Movements: Extraocular movements intact.      Conjunctiva/sclera: Conjunctivae normal.      Pupils: Pupils are equal, round, and reactive to light.   Cardiovascular:      Rate and Rhythm: Normal rate and regular rhythm.      Pulses: Normal pulses.      Heart sounds: Normal heart sounds.   Pulmonary:      Effort: Pulmonary effort is normal.      Breath sounds: Normal breath sounds.   Abdominal:      General: Bowel sounds are normal.      Palpations: Abdomen is soft.   Musculoskeletal:         General: Normal range of motion.      Cervical back: Normal range of motion and neck supple.   Skin:     General: Skin  is warm.      Capillary Refill: Capillary refill takes less than 2 seconds.   Neurological:      General: No focal deficit present.      Mental Status: She is alert and oriented to person, place, and time.      Cranial Nerves: Cranial nerves 2-12 are intact.      Sensory: Sensation is intact.      Motor: Motor function is intact.      Coordination: Coordination is intact.      Gait: Gait is intact.   Psychiatric:         Mood and Affect: Mood normal.         Behavior: Behavior normal.         Judgment: Judgment normal.            Primary Care Physician   Claire Amos    Discharge Orders      Stroke Hospital Follow Up    [facility] will call you to coordinate care as prescribed by your provider. If you don t hear from a representative within 2 business days, please call [phone].         Significant Results and Procedures   Results for orders placed or performed during the hospital encounter of 11/20/22   Head CT w/o contrast    Narrative    EXAM: CT HEAD W/O CONTRAST  LOCATION: [facility]  DATE/TIME: 11/20/2022 3:37 PM    INDICATION: TIA   two episodes of right hand paresthesia and gait instability, 30 mins this AM and 5 min around noon, carotid bruit left, non focal neuro exam  COMPARISON: MRI 11/20/2022. CT 02/03/2022.  TECHNIQUE: Routine CT Head without IV contrast. Multiplanar reformats. Dose reduction techniques were used.    FINDINGS:  INTRACRANIAL CONTENTS: No intracranial hemorrhage, extraaxial collection, or mass effect.  No CT evidence of acute infarct. Mild presumed chronic small vessel ischemic changes. Age-appropriate ventricles and sulci. Extra-axial 2.2 cm mass lesion along   the lateral aspect of the right posterior fossa, most likely representing a meningioma. This appears grossly stable from 02/03/2022, within the limitations of noncontrast technique. There is no associated edema.    VISUALIZED ORBITS/SINUSES/MASTOIDS: No intraorbital abnormality. No  paranasal sinus mucosal disease. No middle ear or mastoid effusion.    BONES/SOFT TISSUES: No scalp hematoma. No skull fracture.      Impression    IMPRESSION:  1.  No evidence of acute infarction or other acute intracranial abnormality.  2.  Mild presumed chronic small vessel ischemic change.  3.  Extra-axial mass lesion along the right lateral aspect of the posterior fossa, favored to represent a noncalcified meningioma. This is grossly unchanged from 02/03/2022, allowing for differences in technique. This finding can be further characterized   by MRI.   CTA Head Neck with Contrast    Narrative    EXAM: CTA HEAD NECK W CONTRAST  LOCATION: [facility]  DATE/TIME: 11/20/2022 3:37 PM    INDICATION: TIA, two episodes of right hand paresthesia and gait instability, 30 mins this AM and 5 min around noon, carotid bruit left, non focal neuro exam.  COMPARISON: Carotid ultrasound 02/03/2022.  CONTRAST: 68 mL [id number].  TECHNIQUE: Head and neck CT angiogram with IV contrast. Axial helical CT images of the head and neck vessels were obtained during the arterial phase of intravenous contrast administration. Axial 2D reconstructed images and multiplanar 3D MIP   reconstructed images of the head and neck vessels were performed by the technologist. Dose reduction techniques were used. All stenosis measurements made according to NASCET criteria unless otherwise specified.    FINDINGS:   HEAD CTA:  ANTERIOR CIRCULATION: The intracranial internal carotid and anterior cerebral arteries appear patent. The right and left middle cerebral arteries are without evidence of proximal occlusion or flow-limiting stenosis. No aneurysm or high flow vascular   malformation is demonstrated.    POSTERIOR CIRCULATION: The diminutive right intradural vertebral artery decreases in caliber at the dural penetration. There is no clearly demonstrable confluence with the basilar artery. This may represent a developmental  finding, though atherosclerotic   disease is not excluded. The dominant left vertebral artery appears patent. The basilar and visualized proximal cerebellar arteries are unremarkable. The posterior cerebral arteries demonstrate no proximal occlusion or flow-limiting stenosis. There is   fetal-type right PCA anatomy. No aneurysm or high flow vascular malformation is demonstrated.     DURAL VENOUS SINUSES: Expected enhancement of the major dural venous sinuses.    NECK CTA:  RIGHT CAROTID: Atherosclerotic plaque results in less than 50% stenosis in the right ICA. No dissection.    LEFT CAROTID: Atherosclerotic plaque results in less than 50% stenosis in the left ICA. No dissection.    VERTEBRAL ARTERIES: No focal stenosis or dissection. Dominant left and smaller right vertebral arteries.    AORTIC ARCH: There is a left-sided aortic arch. No flow-limiting stenosis is apparent at the origins of the brachiocephalic, common carotid, subclavian or vertebral arteries.    NONVASCULAR STRUCTURES: There are moderate emphysematous changes of the visualized lung apices. There is pleural-parenchymal scarring of the lung apices. There are moderate degenerative changes of the cervical spine. The soft tissues of the neck,   including the thyroid and parotid glands, are grossly unremarkable for technique.      Impression    IMPRESSION:   HEAD CTA:   1.  No definite proximal large vessel occlusion.  2.  The diminutive V4 segment does not clearly connect with the basilar artery. This may represent developmental variation, though atherosclerotic disease is not excluded.  3.  Otherwise, no evidence of flow-limiting stenosis.    NECK CTA:  1.  No evidence of hemodynamically significant carotid stenosis based on NASCET criteria.  2.  No evidence for dissection or pseudoaneurysm.  3.  Pulmonary emphysema.   MR Brain w/o & w Contrast    Narrative    EXAM: MR BRAIN W/O and W CONTRAST  LOCATION: [facility]  CENTER  DATE/TIME: 11/20/2022 4:06 PM    INDICATION: TIA symptoms with right hand paresthesia and gait imbalance  COMPARISON: CT 11/20/2022.  CONTRAST: 5 mL Gadavist.  TECHNIQUE: Routine multiplanar multisequence head MRI without and with intravenous contrast.    FINDINGS:  INTRACRANIAL CONTENTS: No acute or subacute infarct. No acute hemorrhage, or extra-axial fluid collections. Scattered nonspecific T2/FLAIR hyperintensities within the cerebral white matter most consistent with mild chronic microvascular ischemic change.   Age-appropriate ventricles and sulci. Low-lying cerebellar tonsils descending approximately 3 mm below the foramen magnum. No pathologic contrast enhancement. There is an avidly enhancing 2.0 x 1.6 x 1.6 cm extra-axial mass lesion along the right lateral   aspect of the posterior fossa. The appearance is most compatible with angioma. There is no evidence of edema brain parenchyma. Mild regional mass effect without midline shift or herniation. The major intracranial flow voids are unremarkable.    SELLA: No abnormality accounting for technique.    OSSEOUS STRUCTURES/SOFT TISSUES: Unremarkable marrow signal unremarkable extracranial soft tissues.     ORBITS: No abnormality accounting for technique.     SINUSES/MASTOIDS: Mild mucosal thickening scattered about the paranasal sinuses. No middle ear or mastoid effusion.       Impression    IMPRESSION:  1.  No evidence of acute infarction or other acute intracranial abnormality.  2.  Mild presumed chronic small vessel ischemic change.  3.  Enhancing 2.0 cm extra-axial mass lesion in the right posterior fossa, most compatible with meningioma. No associated edema  4.  mild cerebellar tonsillar ectopia.       Discharge Medications   Current Discharge Medication List      START taking these medications    Details   clopidogrel (PLAVIX) 75 MG tablet 1 tablet (75 mg) by Oral or NG Tube route daily for 89 days  Qty: 89 tablet, Refills: 0    Associated  Diagnoses: TIA (transient ischemic attack)         CONTINUE these medications which have NOT CHANGED    Details   aspirin 81 MG tablet Take by mouth daily  Qty: 30 tablet    Associated Diagnoses: HTN, goal below 140/90      Cholecalciferol (D3 ADULT PO)       !! metoprolol succinate ER (TOPROL-XL) 100 MG 24 hr tablet Take 1 tablet (100 mg) by mouth daily  Qty: 90 tablet, Refills: 3    Associated Diagnoses: Essential hypertension with goal blood pressure less than 140/90      !! metoprolol succinate ER (TOPROL-XL) 50 MG 24 hr tablet TAKE 1 TABLET BY MOUTH ONCE DAILY ADD  TO  100  MG   YOU  ARE  TAKING  NOW  Qty: 90 tablet, Refills: 3    Associated Diagnoses: Essential hypertension with goal blood pressure less than 140/90      MULTIPLE VITAMIN PO       omega 3 1000 MG CAPS Take 1 capsule by mouth daily      rosuvastatin (CRESTOR) 5 MG tablet Take 1 tablet (5 mg) by mouth daily  Qty: 90 tablet, Refills: 3    Associated Diagnoses: Bilateral carotid artery stenosis       !! - Potential duplicate medications found. Please discuss with provider.        Allergies   No Known Allergies

## 2022-11-21 NOTE — ED NOTES
Pt denies any pain, denies any further numbness/weakness/blurred vision. Denies any current needs.

## 2022-11-21 NOTE — PROGRESS NOTES
Skin affirmation note     Admitting nurse completed full skin assessment, Seth score and Seth interventions. This writer agrees with the initial skin assessment findings.     Shahnaz Carr RN on 11/20/2022 at 9:57 PM

## 2022-11-21 NOTE — PROGRESS NOTES
Speech Therapy Note:    Speech and swallow evaluation completed.  No treatment needs at this time.   Discharge ST.   Report to follow.      Pam Hagen MA, CCC/SLP

## 2022-11-21 NOTE — PROGRESS NOTES
"Patient is alert and oriented x 4. Independent in room. Neuros intact and WNL. Patient does report she has had persistent blurriness in her left eye that began around the end of October of this year.     BP (!) 144/55 (BP Location: Left arm)   Pulse 67   Temp 97.4  F (36.3  C) (Oral)   Resp 16   Ht 1.6 m (5' 3\")   Wt 51 kg (112 lb 7 oz)   LMP  (LMP Unknown)   SpO2 97%   BMI 19.92 kg/m      "

## 2022-11-21 NOTE — PLAN OF CARE
[place] [facility] DISCHARGE NOTE    Patient discharged to home at 3:01 PM via ambulation. Accompanied by spouse and staff. Discharge instructions reviewed with patient, opportunity offered to ask questions. Prescriptions sent to patients preferred pharmacy. All belongings sent with patient.    Joe Braxton RN

## 2022-11-21 NOTE — PLAN OF CARE
Physical Therapy: Per chart review and discussion with pt, no concerns for IP physical therapy. Pt has been indep in her room without device. Pt denies concerns re: strength, balance, mobility, and endurance. Will discontinue orders at this time.     Thank you,   Alanis Tracy, PT, DPT

## 2022-11-21 NOTE — CONSULTS
Care Management Note:     Care Management team received referral due to Stroke protocol.       Per IDT rounds, EMR review, and discussion with the Therapy team,  it has been determined that pt will discharge to home with support of family.  No therapy needs identified.     Pt is noted to be independent in room.      Care Management will close referral at this time, but please place new consult should pt develop new needs during this stay.     KIERSTEN Aquino  [facility] [phone]   [facility]  [phone]

## 2022-11-21 NOTE — CONSULTS
"      [facility]    Stroke Consult Note    Reason for Consult:  Numbness/abnormal gait    Chief Complaint: Numbness and Gait Disturbance       HPI  Clarita Lowry is a 72 year old female with PMH of HTN, HLD and prior tobacco use. She presented to the [facility] ED 11/20/2022 for evaluation of numbness and gait instability. She reported awaking at baseline, and then at 0645 having an episode of numbness/tingling in the right hand accompanied by inability to walk in a straight line & listing to the right--symptoms lasted 30 minutes and then resolved. Then around noon she had a second episode of right arm numbness and gait instability lasting 5 minutes and prompting ED presentation. Presenting /86.    Today, Clarita reports she is feeling \"100%\" back to normal. She reiterates the story above that she had two separate but essentially identical episodes of \"numbness and tingling\" in the right hand and difficulty with walking \"leaning to the right.\" She has never had similar symptoms in the past. She denies identified double vision, loss of vision or speech changes during these episodes. She did not identify clear weakness or numbness in the right leg, but guesses this could be why she felt she was leaning to the right. She had no vertigo.     TIA Evaluation Summarized    MRI and/or Head CT MRI brain: no acute pathology, diffuse atrophy, chronic small vessel white matter changes, small meningioma over the right cerebellum   Intracranial Vasculature CTA: calcification of intracranial left ICA without significant stenosis    Cervical Vasculature CTA: mild stenosis bilat ICA more on the left side with associated calcification and possible soft plaque     Echocardiogram Pending    EKG/Telemetry Sinus rhythm with LBBB   Other Testing Not Applicable      LDL 11/21/2022: 49 mg/dL   A1C 11/20/2022: 5.7 %       ABCD2 Patients Score   Age ? 60 years 1 point 1   Blood Pressure     -SBP ? 140 or DBP ?  " "90    1 point 1   Clinical Features    -Unilateral weakness   -Speech disturbance w/o weakness    -Other    2 points  1 point    0 points 0   Duration of symptoms    ?60 minutes    10-59 minutes    <10 minutes   2 points  1 point  0 points 1   Diabetes  1 point 0   Patient s ABCD2 Score (0-7) = 3       Impression  1. 2 episodes of RUE numbness and impaired mobility, concerning for TIA. Etiology would be embolic stroke of undetermined source (ESUS), however could also be contribution from left ICA plaque/stenosis even though it is mild in severity     Recommendations  - Neurochecks and Vital Signs every 4 hours  - Reduce to ASA 81mg + Plavix 75mg x 90 days; then discontinue Plavix and continue ASA monotherapy at 81mg dose daily indefinitely   -Goal BP is <130/80 with tighter control associated with improved vascular outcomes  -LDL 49 (goal 40-70); continue PTA Crestor with ongoing outpatient titration to maintainLDL goal   -Blood glucose monitoring, Hgb A1c 5.7%, (goal <7% for secondary stroke prevention), follow-up with PCP  - 24-hour Telemetry  - Bedside Glucose Monitoring  - Stroke Education  - Euthermia, Euglycemia  -30 day cardiac event monitor at discharge (orderd)     Patient Follow-up    - in the next 1-2 week(s) with PCP  - in 6-8 weeks with general neurology ([phone])    Thank you for this consult. We will follow peripherally for results of the echocardiogram and if no concerns then will sign off. Please contact us with any additional questions.    Nury SCHWARTZ, CNP  Vascular Neurology  To page me or covering stroke neurology team member, click here: AMCOM   Choose \"On Call\" tab at top, then search dropdown box for \"Neurology Adult\", select location, press Enter, then look for stroke/neuro ICU/telestroke.  _____________________________________________________    Clinically Significant Risk Factors Present on Admission                      Past Medical History   Past Medical History:   Diagnosis " Date     Hyperlipidemia LDL goal <100      Hypertension      Past Surgical History   Past Surgical History:   Procedure Laterality Date     COLONOSCOPY N/A 9/1/2015    Procedure: COLONOSCOPY;  Surgeon: Mayelin Suresh MD;  Location: [facility]     SURGICAL HISTORY OF -       cyst removal from left knee     SURGICAL HISTORY OF -       cryotherapy and laparoscopy - unclear reason why     TUBAL LIGATION  1982    Laparoscopic bilateral tubal ligation     Medications   Home Meds  Prior to Admission medications    Medication Sig Start Date End Date Taking? Authorizing Provider   aspirin 81 MG tablet Take by mouth daily 7/29/15  Yes Claire Amos APRN CNP   Cholecalciferol (D3 ADULT PO)    Yes Reported, Patient   metoprolol succinate ER (TOPROL-XL) 100 MG 24 hr tablet Take 1 tablet (100 mg) by mouth daily  Patient taking differently: Take 100 mg by mouth daily In addition to 50mg equalling 150mg daily; confirmed with patient 2/7/22  Yes Claire Amos APRN CNP   metoprolol succinate ER (TOPROL-XL) 50 MG 24 hr tablet TAKE 1 TABLET BY MOUTH ONCE DAILY ADD  TO  100  MG   YOU  ARE  TAKING  NOW  Patient taking differently: In addition to 100mg equalling 150mg daily; confirmed with patient 2/7/22  Yes Claire Amos APRN CNP   MULTIPLE VITAMIN PO    Yes Reported, Patient   omega 3 1000 MG CAPS Take 1 capsule by mouth daily   Yes Reported, Patient   rosuvastatin (CRESTOR) 5 MG tablet Take 1 tablet (5 mg) by mouth daily 2/17/22  Yes Claire Amos APRN CNP       Scheduled Meds    [START ON 11/22/2022] aspirin  81 mg Oral Daily     clopidogrel  75 mg Oral or NG Tube Daily     enoxaparin ANTICOAGULANT  40 mg Subcutaneous Q24H     metoprolol succinate ER  150 mg Oral Daily     multivitamin, therapeutic  1 tablet Oral Daily     [START ON 11/22/2022] rosuvastatin  40 mg Oral At Bedtime     sodium chloride (PF)  3 mL Intracatheter Q8H     Vitamin D3  25 mcg Oral Daily       Infusion Meds    -  MEDICATION INSTRUCTIONS -       - MEDICATION INSTRUCTIONS -         PRN Meds  acetaminophen, lidocaine 4%, lidocaine (buffered or not buffered), - MEDICATION INSTRUCTIONS -, - MEDICATION INSTRUCTIONS -, naloxone **OR** naloxone **OR** naloxone **OR** naloxone, ondansetron **OR** ondansetron, oxyCODONE, sodium chloride (PF)    Allergies   No Known Allergies  Family History   Family History   Problem Relation Age of Onset     Cerebrovascular Disease Sister 27     C.A.D. Father 63     Depression Mother      Musculoskeletal Disorder Brother         polio     Social History   Social History     Tobacco Use     Smoking status: Former     Packs/day: 1.00     Years: 35.00     Pack years: 35.00     Types: Cigarettes     Quit date: 3/13/2012     Years since quitting: 10.6     Smokeless tobacco: Never   Vaping Use     Vaping Use: Never used   Substance Use Topics     Alcohol use: Yes     Comment: few drinks a week     Drug use: No       Review of Systems   The 10 point Review of Systems is negative other than noted in the HPI or here.        PHYSICAL EXAMINATION   Temp:  [97.4  F (36.3  C)-97.7  F (36.5  C)] 97.4  F (36.3  C)  Pulse:  [56-75] 71  Resp:  [10-22] 16  BP: (137-172)/(44-87) 152/68  SpO2:  [95 %-100 %] 97 %    General Exam  General:  patient lying in bed without any acute distress    HEENT:  normocephalic/atraumatic  Pulmonary:  no respiratory distress    Neuro Exam  Mental Status:  alert, oriented x 3, follows commands, speech clear and fluent, naming and repetition normal  Cranial Nerves:  visual fields intact (tested by nurse), EOMI with normal smooth pursuit, facial sensation intact and symmetric (tested by nurse), facial movements symmetric, hearing not formally tested but intact to conversation, no dysarthria, shoulder shrug equal bilaterally, tongue protrusion midline  Motor:  no abnormal movements, able to move all limbs antigravity spontaneously with no signs of hemiparesis observed, no pronator drift    Reflexes:  unable to test (telestroke)  Sensory:  light touch sensation intact and symmetric throughout upper and lower extremities (assessed by nurse), no extinction on double simultaneous stimulation (assessed by nurse)  Coordination:  normal finger-to-nose and heel-to-shin bilaterally without dysmetria  Station/Gait:  unable to test due to telestroke    Dysphagia Screen  Per Nursing    Stroke Scales    NIHSS  1a. Level of Consciousness 0-->Alert, keenly responsive   1b. LOC Questions 0-->Answers both questions correctly   1c. LOC Commands 0-->Performs both tasks correctly   2.   Best Gaze 0-->Normal   3.   Visual 0-->No visual loss   4.   Facial Palsy 0-->Normal symmetrical movements   5a. Motor Arm, Left 0-->No drift, limb holds 90 (or 45) degrees for full 10 secs   5b. Motor Arm, Right 0-->No drift, limb holds 90 (or 45) degrees for full 10 secs   6a. Motor Leg, Left 0-->No drift, leg holds 30 degree position for full 5 secs   6b. Motor Leg, right 0-->No drift, leg holds 30 degree position for full 5 secs   7.   Limb Ataxia 0-->Absent   8.   Sensory 0-->Normal, no sensory loss   9.   Best Language 0-->No aphasia, normal   10. Dysarthria 0-->Normal   11. Extinction and Inattention  0-->No abnormality   Total 0 (11/21/22 1100)       Imaging  I personally reviewed all imaging; relevant findings per HPI.    Labs Data   CBC  Recent Labs   Lab 11/20/22  1318   WBC 7.7   RBC 4.00   HGB 12.5   HCT 37.2        Basic Metabolic Panel   Recent Labs   Lab 11/21/22  0840 11/21/22  0518 11/20/22  1318 11/20/22  1314   NA  --   --  139  --    POTASSIUM  --   --  3.9  --    CHLORIDE  --   --  102  --    CO2  --   --  29  --    BUN  --   --  16.9  --    CR  --  0.84 0.88  --    *  --  95 98   TETO  --   --  9.7  --      Liver Panel  Recent Labs   Lab 11/20/22  1318   PROTTOTAL 7.4   ALBUMIN 4.7   BILITOTAL 0.3   ALKPHOS 66   AST 29   ALT 21     INR    Recent Labs   Lab Test 11/20/22  1318   INR 0.97      Lipid  Profile    Recent Labs   Lab Test 11/21/22  0518 01/28/22  1117 08/25/20  0858   CHOL 146 159 170   HDL 69 70 61   LDL 49 74 96   TRIG 139 73 65     A1C    Recent Labs   Lab Test 11/20/22  1318   A1C 5.7*     Troponin  No results for input(s): CTROPT, TROPONINIS, TROPONINI, GHTROP in the last 168 hours.       Stroke Consult Data Data   Telestroke Service Details  (for non-emergent stroke consult with tele)  Video start time 11/21/22   1101   Video end time 11/21/22   1123   Type of service telemedicine diagnostic assessment of acute neurological changes   Reason telemedicine is appropriate patient requires assessment with a specialist for diagnosis and treatment of neurological symptoms   Mode of transmission secure interactive audio and video communication per Belia   Originating site (patient location) [facility]    Distant site (provider location) [facility]     I have personally spent a total of 45 minutes providing care today, time spent in reviewing medical records and reviewing tests, examining the patient and obtaining history, coordination of care, and discussion with the patient and/or family regarding diagnostic results, prognosis, symptom management, risks and benefits of management options, and development of plan of care. Greater than 50% was spent in counseling and coordination of care.

## 2022-11-21 NOTE — DISCHARGE INSTRUCTIONS
You have an appointment on Monday November 28th, 2022 at 9:30 am to get a Cardiac Event monitor placed.  Please go to the Heart Clinic at [facility]. [phone]

## 2022-11-21 NOTE — ED NOTES
Observation Brochure and Video    Patient informed of observation status based on provider's order.  Observation brochure was given and the video watched. Patient/Family stated understanding. Questions answered.  Judie Pedraza RN

## 2022-11-28 ENCOUNTER — HOSPITAL ENCOUNTER (OUTPATIENT)
Dept: CARDIOLOGY | Facility: CLINIC | Age: 72
Discharge: HOME | End: 2022-11-28
Attending: NURSE PRACTITIONER | Admitting: NURSE PRACTITIONER
Payer: COMMERCIAL

## 2022-11-28 PROCEDURE — 93270 REMOTE 30 DAY ECG REV/REPORT: CPT

## 2022-11-28 PROCEDURE — 99207 CARDIAC EVENT MONITOR ADULT PEDIATRIC: CPT | Performed by: NURSE PRACTITIONER

## 2022-12-07 ENCOUNTER — OFFICE VISIT (OUTPATIENT)
Dept: FAMILY MEDICINE | Facility: CLINIC | Age: 72
End: 2022-12-07
Payer: COMMERCIAL

## 2022-12-07 VITALS
WEIGHT: 113 LBS | HEART RATE: 84 BPM | TEMPERATURE: 98.1 F | SYSTOLIC BLOOD PRESSURE: 130 MMHG | RESPIRATION RATE: 16 BRPM | DIASTOLIC BLOOD PRESSURE: 84 MMHG | HEIGHT: 63 IN | BODY MASS INDEX: 20.02 KG/M2

## 2022-12-07 DIAGNOSIS — I65.23 BILATERAL CAROTID ARTERY STENOSIS: ICD-10-CM

## 2022-12-07 DIAGNOSIS — I10 ESSENTIAL HYPERTENSION WITH GOAL BLOOD PRESSURE LESS THAN 140/90: Primary | ICD-10-CM

## 2022-12-07 DIAGNOSIS — G45.9 TIA (TRANSIENT ISCHEMIC ATTACK): ICD-10-CM

## 2022-12-07 DIAGNOSIS — I65.23 CAROTID ARTERY CALCIFICATION, BILATERAL: ICD-10-CM

## 2022-12-07 PROCEDURE — 99214 OFFICE O/P EST MOD 30 MIN: CPT | Performed by: NURSE PRACTITIONER

## 2022-12-07 RX ORDER — METOPROLOL SUCCINATE 50 MG/1
TABLET, EXTENDED RELEASE ORAL
Qty: 90 TABLET | Refills: 3 | Status: SHIPPED | OUTPATIENT
Start: 2022-12-07 | End: 2023-10-12

## 2022-12-07 RX ORDER — ROSUVASTATIN CALCIUM 5 MG/1
5 TABLET, COATED ORAL DAILY
Qty: 90 TABLET | Refills: 3 | Status: SHIPPED | OUTPATIENT
Start: 2022-12-07 | End: 2023-10-12

## 2022-12-07 RX ORDER — METOPROLOL SUCCINATE 100 MG/1
100 TABLET, EXTENDED RELEASE ORAL DAILY
Qty: 90 TABLET | Refills: 3 | Status: SHIPPED | OUTPATIENT
Start: 2022-12-07 | End: 2023-10-12

## 2022-12-07 NOTE — PROGRESS NOTES
Assessment & Plan     Essential hypertension with goal blood pressure less than 140/90  Meeting goal  Refilled medication today   - metoprolol succinate ER (TOPROL XL) 100 MG 24 hr tablet  Dispense: 90 tablet; Refill: 3  - metoprolol succinate ER (TOPROL XL) 50 MG 24 hr tablet  Dispense: 90 tablet; Refill: 3    TIA (transient ischemic attack)  Carotid artery calcification, bilateral  ASA 81mg + Plavix 75mg x 90 days; then discontinue Plavix and continue ASA monotherapy at 81mg dose daily indefinitely   -Goal BP is <130/80 with tighter control associated with improved vascular outcomes  -LDL 49 (goal 40-70); continue Crestor with ongoing outpatient titration to maintainLDL goal   -Blood glucose monitoring, Hgb A1c 5.7%, (goal <7% for secondary stroke prevention),   -30 day cardiac event monitor at discharge (orderd)   Refilled   - rosuvastatin (CRESTOR) 5 MG tablet  Dispense: 90 tablet; Refill: 3      Call or return to the clinic with any worsening of symptoms or no resolution. Patient/Parent verbalized understanding and is in agreement. Medication side effects reviewed.   Current Outpatient Medications   Medication Sig Dispense Refill     aspirin 81 MG tablet Take by mouth daily 30 tablet      Cholecalciferol (D3 ADULT PO)        clopidogrel (PLAVIX) 75 MG tablet 1 tablet (75 mg) by Oral or NG Tube route daily for 89 days 89 tablet 0     metoprolol succinate ER (TOPROL XL) 100 MG 24 hr tablet Take 1 tablet (100 mg) by mouth daily 90 tablet 3     metoprolol succinate ER (TOPROL XL) 50 MG 24 hr tablet TAKE 1 TABLET BY MOUTH ONCE DAILY ADD  TO  100  MG   YOU  ARE  TAKING  NOW 90 tablet 3     MULTIPLE VITAMIN PO        omega 3 1000 MG CAPS Take 1 capsule by mouth daily       rosuvastatin (CRESTOR) 5 MG tablet Take 1 tablet (5 mg) by mouth daily 90 tablet 3     Chart documentation with Dragon Voice recognition Software. Although reviewed after completion, some words and grammatical errors may remain.      Return in  about 3 months (around 3/7/2023) for TIA/BP .    LANA Wise CNP  [facility]    Estefania Otto is a 72 year old, presenting for the following health issues:  HOSP D/C      \[facility]\""       Hospital Follow-up Visit:    Hospital/Nursing Home/IP Rehab Facility: [facility]  Date of Admission: 11/20/2022  Date of Discharge: 11/21/2022  Reason(s) for Admission: TIA BP     Was your hospitalization related to COVID-19? No   Problems taking medications regularly:  None  Medication changes since discharge: None  Problems adhering to non-medication therapy:  None  Left nose bleeds at times     Summary of hospitalization:  [facility] discharge summary reviewed  Diagnostic Tests/Treatments reviewed.  Follow up needed: Neurology  Vascular, Holter monitor   Other Healthcare Providers Involved in Patient s Care:         Specialist appointment - Neurology 6 weeks  Update since discharge: improved.     No BP readings bought today   Some bloody nose from the plavix stops easily  Has her holter monitor in place.  Has follow up with neurology in January [employer] lasting 3-5 days with monitor and has to switch the sensor more often    Plan of care communicated with patient   Discharge Diagnoses  TIA         Follow-ups Needed After Discharge     Follow up with PCP in 7-10 days.   Recommend follow up regarding BP control as Systolic BP has been >140 though <160 since admission. Would consider addition of 5mg Amlodipine if home BP remains elevated.   Hospital Recommendations  - Neurochecks and Vital Signs every 4 hours  - Reduce to ASA 81mg + Plavix 75mg x 90 days; then discontinue Plavix and continue ASA monotherapy at 81mg dose daily indefinitely   -Goal BP is <130/80 with tighter control associated with improved vascular outcomes  -LDL 49 (goal 40-70); continue PTA Crestor with ongoing outpatient titration to maintainLDL goal   -Blood glucose monitoring,  "Hgb A1c 5.7%, (goal <7% for secondary stroke prevention), follow-up with PCP  -30 day cardiac event monitor at discharge (orderd)      Patient Follow-up    - in the next 1-2 week(s) with PCP  - in 6-8 weeks with general neurology ([phone])        Doyle Rizvi MD    Review of Systems   Constitutional, HEENT, cardiovascular, pulmonary, GI, , musculoskeletal, neuro, skin, endocrine and psych systems are negative, except as otherwise noted.      Objective    /84   Pulse 84   Temp 98.1  F (36.7  C) (Tympanic)   Resp 16   Ht 1.6 m (5' 3\")   Wt 51.3 kg (113 lb)   LMP  (LMP Unknown)   BMI 20.02 kg/m    Body mass index is 20.02 kg/m .  Physical Exam   GENERAL: healthy, alert and no distress  EYES: Eyes grossly normal to inspection, PERRL and conjunctivae and sclerae normal  HENT: ear canals and TM's normal, nose and mouth without ulcers or lesions  NECK: no adenopathy, no asymmetry, masses, or scars and thyroid normal to palpation bilateral carotid bruit  RESP: lungs clear to auscultation - no rales, rhonchi or wheezes  CV: regular rate and rhythm, normal S1 S2, no S3 or S4, no murmur, click or rub, no peripheral edema and peripheral pulses strong  ABDOMEN: soft, nontender, no hepatosplenomegaly, no masses and bowel sounds normal  MS: no gross musculoskeletal defects noted, no edema  SKIN: no suspicious lesions or rashes  NEURO: Normal strength and tone, mentation intact and speech normal  PSYCH: mentation appears normal, affect normal/bright    Admission on 11/20/2022, Discharged on 11/21/2022   Component Date Value Ref Range Status     Hold Specimen 11/20/2022 [facility]   Final     Hold Specimen 11/20/2022 [facility]   Final     Hold Specimen 11/20/2022 [facility]   Final     Hold Specimen 11/20/2022 [facility]   Final     GLUCOSE BY METER POCT 11/20/2022 98  70 - 99 mg/dL Final     Sodium 11/20/2022 139  136 - 145 mmol/L Final     Potassium 11/20/2022 3.9  3.4 - 5.3 mmol/L Final     Chloride 11/20/2022 102  98 - 107 mmol/L " Final     Carbon Dioxide (CO2) 11/20/2022 29  22 - 29 mmol/L Final     Anion Gap 11/20/2022 8  7 - 15 mmol/L Final     Urea Nitrogen 11/20/2022 16.9  8.0 - 23.0 mg/dL Final     Creatinine 11/20/2022 0.88  0.51 - 0.95 mg/dL Final     Calcium 11/20/2022 9.7  8.8 - 10.2 mg/dL Final     Glucose 11/20/2022 95  70 - 99 mg/dL Final     Alkaline Phosphatase 11/20/2022 66  35 - 104 U/L Final     AST 11/20/2022 29  10 - 35 U/L Final     ALT 11/20/2022 21  10 - 35 U/L Final     Protein Total 11/20/2022 7.4  6.4 - 8.3 g/dL Final     Albumin 11/20/2022 4.7  3.5 - 5.2 g/dL Final     Bilirubin Total 11/20/2022 0.3  <=1.2 mg/dL Final     GFR Estimate 11/20/2022 70  >60 mL/min/1.73m2 Final    Effective December 21, 2021 eGFRcr in adults is calculated using the 2021 CKD-EPI creatinine equation which includes age and gender (Matthew et al., NE, DOI: 10.1056/CUIXcd0253963)     INR 11/20/2022 0.97  0.85 - 1.15 Final     aPTT 11/20/2022 26  22 - 38 Seconds Final     Hemoglobin A1C 11/20/2022 5.7 (H)  <5.7 % Final    Normal <5.7%   Prediabetes 5.7-6.4%    Diabetes 6.5% or higher     Note: Adopted from ADA consensus guidelines.     WBC Count 11/20/2022 7.7  4.0 - 11.0 10e3/uL Final     RBC Count 11/20/2022 4.00  3.80 - 5.20 10e6/uL Final     Hemoglobin 11/20/2022 12.5  11.7 - 15.7 g/dL Final     Hematocrit 11/20/2022 37.2  35.0 - 47.0 % Final     MCV 11/20/2022 93  78 - 100 fL Final     MCH 11/20/2022 31.3  26.5 - 33.0 pg Final     MCHC 11/20/2022 33.6  31.5 - 36.5 g/dL Final     RDW 11/20/2022 11.9  10.0 - 15.0 % Final     Platelet Count 11/20/2022 244  150 - 450 10e3/uL Final     % Neutrophils 11/20/2022 54  % Final     % Lymphocytes 11/20/2022 31  % Final     % Monocytes 11/20/2022 10  % Final     % Eosinophils 11/20/2022 4  % Final     % Basophils 11/20/2022 1  % Final     % Immature Granulocytes 11/20/2022 0  % Final     NRBCs per 100 WBC 11/20/2022 0  <1 /100 Final     Absolute Neutrophils 11/20/2022 4.2  1.6 - 8.3 10e3/uL Final      Absolute Lymphocytes 11/20/2022 2.4  0.8 - 5.3 10e3/uL Final     Absolute Monocytes 11/20/2022 0.8  0.0 - 1.3 10e3/uL Final     Absolute Eosinophils 11/20/2022 0.3  0.0 - 0.7 10e3/uL Final     Absolute Basophils 11/20/2022 0.1  0.0 - 0.2 10e3/uL Final     Absolute Immature Granulocytes 11/20/2022 0.0  <=0.4 10e3/uL Final     Absolute NRBCs 11/20/2022 0.0  10e3/uL Final     Color Urine 11/20/2022 Straw  Colorless, Straw, Light Yellow, Yellow Final     Appearance Urine 11/20/2022 Clear  Clear Final     Glucose Urine 11/20/2022 Negative  Negative mg/dL Final     Bilirubin Urine 11/20/2022 Negative  Negative Final     Ketones Urine 11/20/2022 Negative  Negative mg/dL Final     Specific Gravity Urine 11/20/2022 1.006  1.003 - 1.035 Final     Blood Urine 11/20/2022 Negative  Negative Final     pH Urine 11/20/2022 5.0  5.0 - 7.0 Final     Protein Albumin Urine 11/20/2022 Negative  Negative mg/dL Final     Urobilinogen Urine 11/20/2022 Normal  Normal, 2.0 mg/dL Final     Nitrite Urine 11/20/2022 Negative  Negative Final     Leukocyte Esterase Urine 11/20/2022 Negative  Negative Final     RBC Urine 11/20/2022 1  <=2 /HPF Final     WBC Urine 11/20/2022 1  <=5 /HPF Final     Squamous Epithelials Urine 11/20/2022 <1  <=1 /HPF Final     SARS CoV2 PCR 11/20/2022 Negative  Negative Final    NEGATIVE: SARS-CoV-2 (COVID-19) RNA not detected, presumed negative.     Cholesterol 11/21/2022 146  <200 mg/dL Final     Triglycerides 11/21/2022 139  <150 mg/dL Final     Direct Measure HDL 11/21/2022 69  >=50 mg/dL Final     LDL Cholesterol Calculated 11/21/2022 49  <=100 mg/dL Final     Non HDL Cholesterol 11/21/2022 77  <130 mg/dL Final     LVEF  11/21/2022 55-60%   Final     Creatinine 11/21/2022 0.84  0.51 - 0.95 mg/dL Final     GFR Estimate 11/21/2022 74  >60 mL/min/1.73m2 Final    Effective December 21, 2021 eGFRcr in adults is calculated using the 2021 CKD-EPI creatinine equation which includes age and gender (Matthew et al., NEJM, DOI:  10.1056/LWRZbb7625025)     GLUCOSE BY METER POCT 11/21/2022 118 (H)  70 - 99 mg/dL Final     GLUCOSE BY METER POCT 11/21/2022 109 (H)  70 - 99 mg/dL Final

## 2022-12-07 NOTE — PATIENT INSTRUCTIONS
Complete -30 day cardiac event monitor      Patient Follow-up    - in 6-8 weeks with general neurology ([phone]) as planned  Continue current meds  Take BP at home and record

## 2022-12-30 NOTE — RESULT ENCOUNTER NOTE
MYAH Otto,  I am reviewing test reports for Minh Amos today who is out of the office.     Your heart monitor shows a normal sinus rhythm which is the usual heart rhythm.  There were a few extra beats which are common and not indicating any problems.  There was no sign of atrial fibrillation which I believe is what was being looked for due to the recent TIA event.  PLAN: No other heart monitoring or testing is needed at this time.  ROSANA RUSSELL MD

## 2023-01-12 ENCOUNTER — TELEPHONE (OUTPATIENT)
Dept: FAMILY MEDICINE | Facility: CLINIC | Age: 73
End: 2023-01-12

## 2023-01-12 NOTE — TELEPHONE ENCOUNTER
RN discussed with ROBERT Rangel, who advises pt should keep neuro appt as scheduled for F/U TIA.  LM on identified VM advising pt of this. To call care team if any further questions/ concerns.  VANGIE Walton RN

## 2023-01-12 NOTE — TELEPHONE ENCOUNTER
Patient calling because she has an appointment in [place] on 1/19  for a Stroke follow up. Patient is wondering if she still needs to go to appointment?     Patient received a response from Dr. Wade Deleon on 12/30 for her Cardiac Event Monitor. He was witting in place of Claire Amos who was out of town. Dr. Deleon stated that no other heart monitoring or testing is needed.     Patient is wondering if that means she no longer needs the appointment in [place] on 1/19    Could we send this information to you in [place] or would you prefer to receive a phone call?:   Patient would prefer a phone call   Okay to leave a detailed message?: Yes at Home number on file [phone] (home)    Ok to  Leave detailed message

## 2023-01-13 NOTE — TELEPHONE ENCOUNTER
Pt returned call, advised to keep neurologist appt per Claire's recommendation.   VANGIE Walton RN

## 2023-05-09 ENCOUNTER — ANCILLARY ORDERS (OUTPATIENT)
Dept: FAMILY MEDICINE | Facility: CLINIC | Age: 73
End: 2023-05-09

## 2023-05-09 DIAGNOSIS — Z12.31 VISIT FOR SCREENING MAMMOGRAM: ICD-10-CM

## 2023-06-21 ENCOUNTER — ANCILLARY PROCEDURE (OUTPATIENT)
Dept: MAMMOGRAPHY | Facility: CLINIC | Age: 73
End: 2023-06-21
Attending: NURSE PRACTITIONER
Payer: COMMERCIAL

## 2023-06-21 DIAGNOSIS — Z12.31 VISIT FOR SCREENING MAMMOGRAM: ICD-10-CM

## 2023-06-21 PROCEDURE — 77067 SCR MAMMO BI INCL CAD: CPT | Mod: TC | Performed by: RADIOLOGY

## 2023-06-21 PROCEDURE — 77063 BREAST TOMOSYNTHESIS BI: CPT | Mod: TC | Performed by: RADIOLOGY

## 2023-08-30 ENCOUNTER — PATIENT OUTREACH (OUTPATIENT)
Dept: CARE COORDINATION | Facility: CLINIC | Age: 73
End: 2023-08-30
Payer: COMMERCIAL

## 2023-10-05 ASSESSMENT — ENCOUNTER SYMPTOMS
FEVER: 0
SORE THROAT: 0
ARTHRALGIAS: 0
HEARTBURN: 0
NAUSEA: 0
JOINT SWELLING: 0
PALPITATIONS: 0
EYE PAIN: 0
DYSURIA: 0
HEMATOCHEZIA: 0
COUGH: 0
BREAST MASS: 0
ABDOMINAL PAIN: 0
HEMATURIA: 0
PARESTHESIAS: 0
FREQUENCY: 0
WEAKNESS: 0
MYALGIAS: 0
DIZZINESS: 0
NERVOUS/ANXIOUS: 0
SHORTNESS OF BREATH: 0
DIARRHEA: 0
CONSTIPATION: 0
CHILLS: 0
HEADACHES: 0

## 2023-10-05 ASSESSMENT — ACTIVITIES OF DAILY LIVING (ADL): CURRENT_FUNCTION: NO ASSISTANCE NEEDED

## 2023-10-12 ENCOUNTER — OFFICE VISIT (OUTPATIENT)
Dept: FAMILY MEDICINE | Facility: CLINIC | Age: 73
End: 2023-10-12
Payer: COMMERCIAL

## 2023-10-12 VITALS
TEMPERATURE: 97.3 F | HEIGHT: 64 IN | OXYGEN SATURATION: 99 % | HEART RATE: 69 BPM | WEIGHT: 113 LBS | RESPIRATION RATE: 14 BRPM | BODY MASS INDEX: 19.29 KG/M2 | SYSTOLIC BLOOD PRESSURE: 133 MMHG | DIASTOLIC BLOOD PRESSURE: 60 MMHG

## 2023-10-12 DIAGNOSIS — Z29.11 NEED FOR VACCINATION AGAINST RESPIRATORY SYNCYTIAL VIRUS: ICD-10-CM

## 2023-10-12 DIAGNOSIS — I65.23 BILATERAL CAROTID ARTERY STENOSIS: ICD-10-CM

## 2023-10-12 DIAGNOSIS — Z00.00 MEDICARE ANNUAL WELLNESS VISIT, SUBSEQUENT: Primary | ICD-10-CM

## 2023-10-12 DIAGNOSIS — I10 ESSENTIAL HYPERTENSION WITH GOAL BLOOD PRESSURE LESS THAN 140/90: ICD-10-CM

## 2023-10-12 PROCEDURE — G0439 PPPS, SUBSEQ VISIT: HCPCS | Performed by: NURSE PRACTITIONER

## 2023-10-12 PROCEDURE — 99214 OFFICE O/P EST MOD 30 MIN: CPT | Mod: 25 | Performed by: NURSE PRACTITIONER

## 2023-10-12 RX ORDER — METOPROLOL SUCCINATE 100 MG/1
100 TABLET, EXTENDED RELEASE ORAL DAILY
Qty: 90 TABLET | Refills: 3 | Status: SHIPPED | OUTPATIENT
Start: 2023-10-12 | End: 2024-10-03

## 2023-10-12 RX ORDER — ROSUVASTATIN CALCIUM 5 MG/1
5 TABLET, COATED ORAL DAILY
Qty: 90 TABLET | Refills: 3 | Status: SHIPPED | OUTPATIENT
Start: 2023-10-12

## 2023-10-12 RX ORDER — METOPROLOL SUCCINATE 50 MG/1
TABLET, EXTENDED RELEASE ORAL
Qty: 90 TABLET | Refills: 3 | Status: SHIPPED | OUTPATIENT
Start: 2023-10-12 | End: 2024-10-03

## 2023-10-12 RX ORDER — RESPIRATORY SYNCYTIAL VIRUS VACCINE 120MCG/0.5
0.5 KIT INTRAMUSCULAR ONCE
Qty: 1 EACH | Refills: 0 | Status: CANCELLED | OUTPATIENT
Start: 2023-10-12 | End: 2023-10-12

## 2023-10-12 ASSESSMENT — ENCOUNTER SYMPTOMS
FREQUENCY: 0
PARESTHESIAS: 0
DYSURIA: 0
HEMATURIA: 0
PALPITATIONS: 0
CHILLS: 0
ABDOMINAL PAIN: 0
DIZZINESS: 0
CONSTIPATION: 0
HEARTBURN: 0
SORE THROAT: 0
NAUSEA: 0
HEADACHES: 0
DIARRHEA: 0
FEVER: 0
MYALGIAS: 0
HEMATOCHEZIA: 0
NERVOUS/ANXIOUS: 0
SHORTNESS OF BREATH: 0
COUGH: 0
BREAST MASS: 0
ARTHRALGIAS: 0
JOINT SWELLING: 0
WEAKNESS: 0
EYE PAIN: 0

## 2023-10-12 ASSESSMENT — PAIN SCALES - GENERAL: PAINLEVEL: MILD PAIN (2)

## 2023-10-12 ASSESSMENT — ACTIVITIES OF DAILY LIVING (ADL): CURRENT_FUNCTION: NO ASSISTANCE NEEDED

## 2023-10-12 NOTE — PROGRESS NOTES
"SUBJECTIVE:   Clarita is a 73 year old who presents for Preventive Visit.      10/12/2023    10:10 AM   Additional Questions   Roomed by Thelma   Accompanied by self       Are you in the first 12 months of your Medicare coverage?  No    Healthy Habits:     In general, how would you rate your overall health?  Excellent    Frequency of exercise:  6-7 days/week    Duration of exercise:  15-30 minutes    Do you usually eat at least 4 servings of fruit and vegetables a day, include whole grains    & fiber and avoid regularly eating high fat or \"junk\" foods?  No    Taking medications regularly:  Yes    Medication side effects:  Not applicable    Ability to successfully perform activities of daily living:  No assistance needed    Home Safety:  No safety concerns identified    Hearing Impairment:  No hearing concerns    In the past 6 months, have you been bothered by leaking of urine?  No    In general, how would you rate your overall mental or emotional health?  Excellent    Additional concerns today:  No      Today's PHQ-2 Score:       10/12/2023    10:04 AM   PHQ-2 ( 1999 Pfizer)   Q1: Little interest or pleasure in doing things 0   Q2: Feeling down, depressed or hopeless 0   PHQ-2 Score 0   Q1: Little interest or pleasure in doing things Not at all   Q2: Feeling down, depressed or hopeless Not at all   PHQ-2 Score 0         TIA last fall  Did not follow up with neurology as directed- declined follow up at this time  No longer taking plavix took for 3 months then quit staying on 81 mg daily  Non smoker since 2012     Eye exam next month    Have you ever done Advance Care Planning? (For example, a Health Directive, POLST, or a discussion with a medical provider or your loved ones about your wishes): Yes, advance care planning is on file.       Fall risk  Fallen 2 or more times in the past year?: No  Any fall with injury in the past year?: No      Cognitive Screening   1) Repeat 3 items (Leader, Season, Table)    2) Clock " draw: NORMAL  3) 3 item recall: Recalls 3 objects  Results: 3 items recalled: COGNITIVE IMPAIRMENT LESS LIKELY    Mini-CogTM Copyright JEFRY Gonsales. Licensed by the author for use in [facility]; reprinted with permission (tulio@[facility]). All rights reserved.      Do you have sleep apnea, excessive snoring or daytime drowsiness? : no    Reviewed and updated as needed this visit by clinical staff   Tobacco  Allergies  Meds              Reviewed and updated as needed this visit by Provider                 Social History     Tobacco Use    Smoking status: Former     Packs/day: 1.00     Years: 35.00     Additional pack years: 0.00     Total pack years: 35.00     Types: Cigarettes     Quit date: 3/13/2012     Years since quittin.5    Smokeless tobacco: Never   Substance Use Topics    Alcohol use: Yes     Comment: few drinks a week             10/5/2023     9:41 AM   Alcohol Use   Prescreen: >3 drinks/day or >7 drinks/week? No     Do you have a current opioid prescription? No  Do you use any other controlled substances or medications that are not prescribed by a provider? None          Hypertension Follow-up    Do you check your blood pressure regularly outside of the clinic? Yes   Are you following a low salt diet? No  Are your blood pressures ever more than 140 on the top number (systolic) OR more   than 90 on the bottom number (diastolic), for example 140/90? Yes    Current providers sharing in care for this patient include:   Patient Care Team:  Claire Amos APRN CNP as PCP - General (Family Practice)  Claire Amos APRN CNP as Assigned PCP  Meagan Sotelo APRN CNP as Nurse Practitioner (Neurology)    The following health maintenance items are reviewed in Epic and correct as of today:  Health Maintenance   Topic Date Due    RSV VACCINE 60+ (1 - 1-dose 60+ series) Never done    ANNUAL REVIEW OF HM ORDERS  2023    MEDICARE ANNUAL WELLNESS VISIT  2023    FALL RISK  ASSESSMENT  10/12/2024    MAMMO SCREENING  2025    DTAP/TDAP/TD IMMUNIZATION (2 - Td or Tdap) 2025    COLORECTAL CANCER SCREENING  2025    ADVANCE CARE PLANNING  2027    LIPID  2027    DEXA  10/16/2032    HEPATITIS C SCREENING  Completed    PHQ-2 (once per calendar year)  Completed    IPV IMMUNIZATION  Aged Out    HPV IMMUNIZATION  Aged Out    MENINGITIS IMMUNIZATION  Aged Out    INFLUENZA VACCINE  Discontinued    Pneumococcal Vaccine: 65+ Years  Discontinued    ZOSTER IMMUNIZATION  Discontinued    LUNG CANCER SCREENING  Discontinued    COVID-19 Vaccine  Discontinued     Labs reviewed in EPIC  BP Readings from Last 3 Encounters:   10/12/23 133/60   22 130/84   22 (!) 166/64    Wt Readings from Last 3 Encounters:   10/12/23 51.3 kg (113 lb)   22 51.3 kg (113 lb)   22 51 kg (112 lb 7 oz)                  Patient Active Problem List   Diagnosis    Chest pain    Hyperlipidemia LDL goal <70    HTN, goal below 140/90    Benign neoplasm of colon    Bilateral carotid artery stenosis    TIA (transient ischemic attack)     Past Surgical History:   Procedure Laterality Date    COLONOSCOPY N/A 2015    Procedure: COLONOSCOPY;  Surgeon: Mayelin Suresh MD;  Location: [facility]    SURGICAL HISTORY OF -       cyst removal from left knee    SURGICAL HISTORY OF -       cryotherapy and laparoscopy - unclear reason why    TUBAL LIGATION  1982    Laparoscopic bilateral tubal ligation       Social History     Tobacco Use    Smoking status: Former     Packs/day: 1.00     Years: 35.00     Additional pack years: 0.00     Total pack years: 35.00     Types: Cigarettes     Quit date: 3/13/2012     Years since quittin.6    Smokeless tobacco: Never   Substance Use Topics    Alcohol use: Yes     Comment: few drinks a week     Family History   Problem Relation Age of Onset    Cerebrovascular Disease Sister 27    C.A.D. Father 63    Depression Mother     Musculoskeletal Disorder  "Brother         nas         Current Outpatient Medications   Medication Sig Dispense Refill    aspirin 81 MG tablet Take by mouth daily 30 tablet     Cholecalciferol (D3 ADULT PO)       metoprolol succinate ER (TOPROL XL) 100 MG 24 hr tablet Take 1 tablet (100 mg) by mouth daily 90 tablet 3    metoprolol succinate ER (TOPROL XL) 50 MG 24 hr tablet TAKE 1 TABLET BY MOUTH ONCE DAILY ADD  TO  100  MG   YOU  ARE  TAKING  NOW 90 tablet 3    MULTIPLE VITAMIN PO       rosuvastatin (CRESTOR) 5 MG tablet Take 1 tablet (5 mg) by mouth daily 90 tablet 3    omega 3 1000 MG CAPS Take 1 capsule by mouth daily (Patient not taking: Reported on 10/12/2023)       No Known Allergies  Mammogram Screening: Mammogram Screening - Mammography discussed and declined        Review of Systems   Constitutional:  Negative for chills and fever.   HENT:  Negative for congestion, ear pain, hearing loss and sore throat.    Eyes:  Negative for pain and visual disturbance.   Respiratory:  Negative for cough and shortness of breath.    Cardiovascular:  Negative for chest pain, palpitations and peripheral edema.   Gastrointestinal:  Negative for abdominal pain, constipation, diarrhea, heartburn, hematochezia and nausea.   Breasts:  Negative for tenderness, breast mass and discharge.   Genitourinary:  Negative for dysuria, frequency, genital sores, hematuria, pelvic pain, urgency, vaginal bleeding and vaginal discharge.   Musculoskeletal:  Negative for arthralgias, joint swelling and myalgias.   Skin:  Negative for rash.   Neurological:  Negative for dizziness, weakness, headaches and paresthesias.   Psychiatric/Behavioral:  Negative for mood changes. The patient is not nervous/anxious.          OBJECTIVE:   /60   Pulse 69   Temp 97.3  F (36.3  C) (Tympanic)   Resp 14   Ht 1.626 m (5' 4\")   Wt 51.3 kg (113 lb)   LMP 09/18/2000   SpO2 99%   BMI 19.40 kg/m   Estimated body mass index is 19.4 kg/m  as calculated from the following:    " "Height as of this encounter: 1.626 m (5' 4\").    Weight as of this encounter: 51.3 kg (113 lb).  Physical Exam  GENERAL APPEARANCE: healthy, alert and no distress  EYES: Eyes grossly normal to inspection, PERRL and conjunctivae and sclerae normal  HENT: ear canals and TM's normal, nose and mouth without ulcers or lesions, oropharynx clear and oral mucous membranes moist  NECK: no adenopathy, no asymmetry, masses, or scars and thyroid normal to palpation  RESP: lungs clear to auscultation - no rales, rhonchi or wheezes  CV: regular rate and rhythm, normal S1 S2, no S3 or S4, no murmur, click or rub, no peripheral edema and peripheral pulses strong  ABDOMEN: soft, nontender, no hepatosplenomegaly, no masses and bowel sounds normal  MS: no musculoskeletal defects are noted and gait is age appropriate without ataxia  SKIN: no suspicious lesions or rashes  NEURO: Normal strength and tone, sensory exam grossly normal, mentation intact and speech normal  PSYCH: mentation appears normal and affect normal/bright    Diagnostic Test Results:  Labs reviewed in Epic      ASSESSMENT / PLAN:   Clarita was seen today for physical and hypertension.    Diagnoses and all orders for this visit:    Medicare annual wellness visit, subsequent    Bilateral carotid artery stenosis  -     rosuvastatin (CRESTOR) 5 MG tablet; Take 1 tablet (5 mg) by mouth daily  -     CBC with platelets and differential; Future  -     Comprehensive metabolic panel (BMP + Alb, Alk Phos, ALT, AST, Total. Bili, TP); Future  -     TSH with free T4 reflex; Future  -     Lipid panel reflex to direct LDL Fasting; Future  Follow up with vascular as previously discussed   -     OFFICE/OUTPT VISIT,EST,LEVL IV    Essential hypertension with goal blood pressure less than 140/90  -     metoprolol succinate ER (TOPROL XL) 50 MG 24 hr tablet; TAKE 1 TABLET BY MOUTH ONCE DAILY ADD  TO  100  MG   YOU  ARE  TAKING  NOW  -     metoprolol succinate ER (TOPROL XL) 100 MG 24 hr " tablet; Take 1 tablet (100 mg) by mouth daily  -     CBC with platelets and differential; Future  -     Comprehensive metabolic panel (BMP + Alb, Alk Phos, ALT, AST, Total. Bili, TP); Future  -     TSH with free T4 reflex; Future  -     Lipid panel reflex to direct LDL Fasting; Future  -     OFFICE/OUTPT VISIT,EST,LEVL IV    Need for vaccination against respiratory syncytial virus  Thru pharmacy       Other orders  -     REVIEW OF HEALTH MAINTENANCE PROTOCOL ORDERS              COUNSELING:  Reviewed preventive health counseling, as reflected in patient instructions        She reports that she quit smoking about 11 years ago. Her smoking use included cigarettes. She has a 35.00 pack-year smoking history. She has never used smokeless tobacco.      Appropriate preventive services were discussed with this patient, including applicable screening as appropriate for fall prevention, nutrition, physical activity, Tobacco-use cessation, weight loss and cognition.  Checklist reviewing preventive services available has been given to the patient.    Reviewed patients plan of care and provided an AVS. The Basic Care Plan (routine screening as documented in Health Maintenance) for Clarita meets the Care Plan requirement. This Care Plan has been established and reviewed with the Patient.    Call or return to the clinic with any worsening of symptoms or no resolution. Patient/Parent verbalized understanding and is in agreement. Medication side effects reviewed.   Current Outpatient Medications   Medication Sig Dispense Refill    aspirin 81 MG tablet Take by mouth daily 30 tablet     Cholecalciferol (D3 ADULT PO)       metoprolol succinate ER (TOPROL XL) 100 MG 24 hr tablet Take 1 tablet (100 mg) by mouth daily 90 tablet 3    metoprolol succinate ER (TOPROL XL) 50 MG 24 hr tablet TAKE 1 TABLET BY MOUTH ONCE DAILY ADD  TO  100  MG   YOU  ARE  TAKING  NOW 90 tablet 3    MULTIPLE VITAMIN PO       rosuvastatin (CRESTOR) 5 MG tablet Take 1  tablet (5 mg) by mouth daily 90 tablet 3    omega 3 1000 MG CAPS Take 1 capsule by mouth daily (Patient not taking: Reported on 10/12/2023)         Chart documentation with Dragon Voice recognition Software. Although reviewed after completion, some words and grammatical errors may remain.      LANA Wise CNP  [facility]    Identified Health Risks:  I have reviewed Opioid Use Disorder and Substance Use Disorder risk factors and made any needed referrals.

## 2023-11-08 ENCOUNTER — LAB (OUTPATIENT)
Dept: LAB | Facility: CLINIC | Age: 73
End: 2023-11-08
Payer: COMMERCIAL

## 2023-11-08 DIAGNOSIS — I65.23 BILATERAL CAROTID ARTERY STENOSIS: ICD-10-CM

## 2023-11-08 DIAGNOSIS — I10 ESSENTIAL HYPERTENSION WITH GOAL BLOOD PRESSURE LESS THAN 140/90: ICD-10-CM

## 2023-11-08 LAB
ALBUMIN SERPL BCG-MCNC: 4.4 G/DL (ref 3.5–5.2)
ALP SERPL-CCNC: 67 U/L (ref 35–104)
ALT SERPL W P-5'-P-CCNC: 24 U/L (ref 0–50)
ANION GAP SERPL CALCULATED.3IONS-SCNC: 13 MMOL/L (ref 7–15)
AST SERPL W P-5'-P-CCNC: 30 U/L (ref 0–45)
BASOPHILS # BLD AUTO: 0 10E3/UL (ref 0–0.2)
BASOPHILS NFR BLD AUTO: 0 %
BILIRUB SERPL-MCNC: 0.5 MG/DL
BUN SERPL-MCNC: 13.5 MG/DL (ref 8–23)
CALCIUM SERPL-MCNC: 9.8 MG/DL (ref 8.8–10.2)
CHLORIDE SERPL-SCNC: 102 MMOL/L (ref 98–107)
CHOLEST SERPL-MCNC: 136 MG/DL
CREAT SERPL-MCNC: 0.96 MG/DL (ref 0.51–0.95)
DEPRECATED HCO3 PLAS-SCNC: 25 MMOL/L (ref 22–29)
EGFRCR SERPLBLD CKD-EPI 2021: 63 ML/MIN/1.73M2
EOSINOPHIL # BLD AUTO: 0.4 10E3/UL (ref 0–0.7)
EOSINOPHIL NFR BLD AUTO: 5 %
ERYTHROCYTE [DISTWIDTH] IN BLOOD BY AUTOMATED COUNT: 12 % (ref 10–15)
GLUCOSE SERPL-MCNC: 99 MG/DL (ref 70–99)
HCT VFR BLD AUTO: 37.1 % (ref 35–47)
HDLC SERPL-MCNC: 63 MG/DL
HGB BLD-MCNC: 12.2 G/DL (ref 11.7–15.7)
IMM GRANULOCYTES # BLD: 0 10E3/UL
IMM GRANULOCYTES NFR BLD: 0 %
LDLC SERPL CALC-MCNC: 58 MG/DL
LYMPHOCYTES # BLD AUTO: 1.8 10E3/UL (ref 0.8–5.3)
LYMPHOCYTES NFR BLD AUTO: 26 %
MCH RBC QN AUTO: 31.6 PG (ref 26.5–33)
MCHC RBC AUTO-ENTMCNC: 32.9 G/DL (ref 31.5–36.5)
MCV RBC AUTO: 96 FL (ref 78–100)
MONOCYTES # BLD AUTO: 1 10E3/UL (ref 0–1.3)
MONOCYTES NFR BLD AUTO: 14 %
NEUTROPHILS # BLD AUTO: 3.7 10E3/UL (ref 1.6–8.3)
NEUTROPHILS NFR BLD AUTO: 54 %
NONHDLC SERPL-MCNC: 73 MG/DL
PLATELET # BLD AUTO: 197 10E3/UL (ref 150–450)
POTASSIUM SERPL-SCNC: 4.1 MMOL/L (ref 3.4–5.3)
PROT SERPL-MCNC: 7.1 G/DL (ref 6.4–8.3)
RBC # BLD AUTO: 3.86 10E6/UL (ref 3.8–5.2)
SODIUM SERPL-SCNC: 140 MMOL/L (ref 135–145)
TRIGL SERPL-MCNC: 77 MG/DL
TSH SERPL DL<=0.005 MIU/L-ACNC: 2.4 UIU/ML (ref 0.3–4.2)
WBC # BLD AUTO: 6.9 10E3/UL (ref 4–11)

## 2023-11-08 PROCEDURE — 80061 LIPID PANEL: CPT

## 2023-11-08 PROCEDURE — 80053 COMPREHEN METABOLIC PANEL: CPT

## 2023-11-08 PROCEDURE — 36415 COLL VENOUS BLD VENIPUNCTURE: CPT

## 2023-11-08 PROCEDURE — 85025 COMPLETE CBC W/AUTO DIFF WBC: CPT

## 2023-11-08 PROCEDURE — 84443 ASSAY THYROID STIM HORMONE: CPT

## 2023-11-09 ENCOUNTER — PATIENT OUTREACH (OUTPATIENT)
Dept: GASTROENTEROLOGY | Facility: CLINIC | Age: 73
End: 2023-11-09
Payer: COMMERCIAL

## 2024-07-31 ENCOUNTER — ANCILLARY PROCEDURE (OUTPATIENT)
Dept: MAMMOGRAPHY | Facility: CLINIC | Age: 74
End: 2024-07-31
Attending: NURSE PRACTITIONER
Payer: COMMERCIAL

## 2024-07-31 DIAGNOSIS — Z12.31 VISIT FOR SCREENING MAMMOGRAM: ICD-10-CM

## 2024-07-31 PROCEDURE — 77063 BREAST TOMOSYNTHESIS BI: CPT | Mod: TC | Performed by: RADIOLOGY

## 2024-07-31 PROCEDURE — 77067 SCR MAMMO BI INCL CAD: CPT | Mod: TC | Performed by: RADIOLOGY

## 2024-10-03 DIAGNOSIS — I10 ESSENTIAL HYPERTENSION WITH GOAL BLOOD PRESSURE LESS THAN 140/90: ICD-10-CM

## 2024-10-03 RX ORDER — METOPROLOL SUCCINATE 100 MG/1
100 TABLET, EXTENDED RELEASE ORAL DAILY
Qty: 90 TABLET | Refills: 0 | Status: SHIPPED | OUTPATIENT
Start: 2024-10-03 | End: 2024-10-23

## 2024-10-03 RX ORDER — METOPROLOL SUCCINATE 50 MG/1
TABLET, EXTENDED RELEASE ORAL
Qty: 90 TABLET | Refills: 0 | Status: SHIPPED | OUTPATIENT
Start: 2024-10-03 | End: 2024-10-23

## 2024-10-19 SDOH — HEALTH STABILITY: PHYSICAL HEALTH: ON AVERAGE, HOW MANY DAYS PER WEEK DO YOU ENGAGE IN MODERATE TO STRENUOUS EXERCISE (LIKE A BRISK WALK)?: 5 DAYS

## 2024-10-19 SDOH — HEALTH STABILITY: PHYSICAL HEALTH: ON AVERAGE, HOW MANY MINUTES DO YOU ENGAGE IN EXERCISE AT THIS LEVEL?: 150+ MIN

## 2024-10-19 ASSESSMENT — SOCIAL DETERMINANTS OF HEALTH (SDOH): HOW OFTEN DO YOU GET TOGETHER WITH FRIENDS OR RELATIVES?: MORE THAN THREE TIMES A WEEK

## 2024-10-23 ENCOUNTER — OFFICE VISIT (OUTPATIENT)
Dept: FAMILY MEDICINE | Facility: CLINIC | Age: 74
End: 2024-10-23
Attending: NURSE PRACTITIONER
Payer: COMMERCIAL

## 2024-10-23 ENCOUNTER — TELEPHONE (OUTPATIENT)
Dept: FAMILY MEDICINE | Facility: CLINIC | Age: 74
End: 2024-10-23

## 2024-10-23 VITALS
RESPIRATION RATE: 14 BRPM | OXYGEN SATURATION: 99 % | TEMPERATURE: 97 F | HEIGHT: 63 IN | WEIGHT: 117 LBS | BODY MASS INDEX: 20.73 KG/M2 | DIASTOLIC BLOOD PRESSURE: 70 MMHG | SYSTOLIC BLOOD PRESSURE: 139 MMHG | HEART RATE: 72 BPM

## 2024-10-23 DIAGNOSIS — M85.80 OSTEOPENIA, UNSPECIFIED LOCATION: ICD-10-CM

## 2024-10-23 DIAGNOSIS — I10 HTN, GOAL BELOW 140/90: ICD-10-CM

## 2024-10-23 DIAGNOSIS — E78.5 HYPERLIPIDEMIA LDL GOAL <70: ICD-10-CM

## 2024-10-23 DIAGNOSIS — E55.9 VITAMIN D DEFICIENCY: ICD-10-CM

## 2024-10-23 DIAGNOSIS — Z78.0 MENOPAUSE: ICD-10-CM

## 2024-10-23 DIAGNOSIS — I65.23 BILATERAL CAROTID ARTERY STENOSIS: ICD-10-CM

## 2024-10-23 DIAGNOSIS — Z00.00 ENCOUNTER FOR MEDICARE ANNUAL WELLNESS EXAM: Primary | ICD-10-CM

## 2024-10-23 DIAGNOSIS — R23.3 EASY BRUISING: ICD-10-CM

## 2024-10-23 LAB
ANION GAP SERPL CALCULATED.3IONS-SCNC: 9 MMOL/L (ref 7–15)
AST SERPL W P-5'-P-CCNC: 33 U/L (ref 0–45)
BASOPHILS # BLD AUTO: 0 10E3/UL (ref 0–0.2)
BASOPHILS NFR BLD AUTO: 1 %
BUN SERPL-MCNC: 14.1 MG/DL (ref 8–23)
CALCIUM SERPL-MCNC: 9.6 MG/DL (ref 8.8–10.4)
CHLORIDE SERPL-SCNC: 105 MMOL/L (ref 98–107)
CHOLEST SERPL-MCNC: 151 MG/DL
CREAT SERPL-MCNC: 0.95 MG/DL (ref 0.51–0.95)
EGFRCR SERPLBLD CKD-EPI 2021: 63 ML/MIN/1.73M2
EOSINOPHIL # BLD AUTO: 0.2 10E3/UL (ref 0–0.7)
EOSINOPHIL NFR BLD AUTO: 4 %
ERYTHROCYTE [DISTWIDTH] IN BLOOD BY AUTOMATED COUNT: 12.2 % (ref 10–15)
FASTING STATUS PATIENT QL REPORTED: NO
FASTING STATUS PATIENT QL REPORTED: NO
GLUCOSE SERPL-MCNC: 100 MG/DL (ref 70–99)
HCO3 SERPL-SCNC: 28 MMOL/L (ref 22–29)
HCT VFR BLD AUTO: 38.1 % (ref 35–47)
HDLC SERPL-MCNC: 70 MG/DL
HGB BLD-MCNC: 12.6 G/DL (ref 11.7–15.7)
IMM GRANULOCYTES # BLD: 0 10E3/UL
IMM GRANULOCYTES NFR BLD: 0 %
LDLC SERPL CALC-MCNC: 56 MG/DL
LYMPHOCYTES # BLD AUTO: 1.6 10E3/UL (ref 0.8–5.3)
LYMPHOCYTES NFR BLD AUTO: 27 %
MCH RBC QN AUTO: 32 PG (ref 26.5–33)
MCHC RBC AUTO-ENTMCNC: 33.1 G/DL (ref 31.5–36.5)
MCV RBC AUTO: 97 FL (ref 78–100)
MONOCYTES # BLD AUTO: 0.9 10E3/UL (ref 0–1.3)
MONOCYTES NFR BLD AUTO: 15 %
NEUTROPHILS # BLD AUTO: 3.1 10E3/UL (ref 1.6–8.3)
NEUTROPHILS NFR BLD AUTO: 53 %
NONHDLC SERPL-MCNC: 81 MG/DL
PLATELET # BLD AUTO: 211 10E3/UL (ref 150–450)
POTASSIUM SERPL-SCNC: 4.5 MMOL/L (ref 3.4–5.3)
RBC # BLD AUTO: 3.94 10E6/UL (ref 3.8–5.2)
SODIUM SERPL-SCNC: 142 MMOL/L (ref 135–145)
TRIGL SERPL-MCNC: 125 MG/DL
VIT D+METAB SERPL-MCNC: 61 NG/ML (ref 20–50)
WBC # BLD AUTO: 5.8 10E3/UL (ref 4–11)

## 2024-10-23 PROCEDURE — 85025 COMPLETE CBC W/AUTO DIFF WBC: CPT | Performed by: NURSE PRACTITIONER

## 2024-10-23 PROCEDURE — 36415 COLL VENOUS BLD VENIPUNCTURE: CPT | Performed by: NURSE PRACTITIONER

## 2024-10-23 PROCEDURE — 99214 OFFICE O/P EST MOD 30 MIN: CPT | Mod: 25 | Performed by: NURSE PRACTITIONER

## 2024-10-23 PROCEDURE — 80061 LIPID PANEL: CPT | Performed by: NURSE PRACTITIONER

## 2024-10-23 PROCEDURE — G0439 PPPS, SUBSEQ VISIT: HCPCS | Performed by: NURSE PRACTITIONER

## 2024-10-23 PROCEDURE — 84450 TRANSFERASE (AST) (SGOT): CPT | Performed by: NURSE PRACTITIONER

## 2024-10-23 PROCEDURE — 82306 VITAMIN D 25 HYDROXY: CPT | Performed by: NURSE PRACTITIONER

## 2024-10-23 PROCEDURE — 80048 BASIC METABOLIC PNL TOTAL CA: CPT | Performed by: NURSE PRACTITIONER

## 2024-10-23 RX ORDER — METOPROLOL SUCCINATE 50 MG/1
TABLET, EXTENDED RELEASE ORAL
Qty: 90 TABLET | Refills: 1 | Status: SHIPPED | OUTPATIENT
Start: 2024-10-23

## 2024-10-23 RX ORDER — METOPROLOL SUCCINATE 100 MG/1
100 TABLET, EXTENDED RELEASE ORAL DAILY
Qty: 90 TABLET | Refills: 1 | Status: SHIPPED | OUTPATIENT
Start: 2024-10-23

## 2024-10-23 RX ORDER — ROSUVASTATIN CALCIUM 5 MG/1
5 TABLET, COATED ORAL DAILY
Qty: 90 TABLET | Refills: 3 | Status: SHIPPED | OUTPATIENT
Start: 2024-10-23

## 2024-10-23 ASSESSMENT — PAIN SCALES - GENERAL: PAINLEVEL_OUTOF10: NO PAIN (0)

## 2024-10-23 NOTE — PROGRESS NOTES
Preventive Care Visit  [facility]  LANA Wise CNP, Family Medicine  Oct 23, 2024      Assessment & Plan     Encounter for Medicare annual wellness exam      HTN, goal below 140/90  Continue current meds  - BASIC METABOLIC PANEL    Hyperlipidemia LDL goal <70  Continue statin     - Lipid panel reflex to direct LDL Non-fasting  - AST    Bilateral carotid artery stenosis    - US Carotid Bilateral; Future    Menopause    - DX Bone Density; Future    Osteopenia, unspecified location    - DX Bone Density; Future    Vitamin D deficiency    - Vitamin D Deficiency    Easy bruising    - CBC with platelets and differential      Immunzation declined today            Counseling  Appropriate preventive services were addressed with this patient via screening, questionnaire, or discussion as appropriate for fall prevention, nutrition, physical activity, Tobacco-use cessation, social engagement, weight loss and cognition.  Checklist reviewing preventive services available has been given to the patient.  Reviewed patient's diet, addressing concerns and/or questions.       Regular exercise  Follow up with neurology as previously directed   Call to schedule cartoid doppler, [place], [place]   See Patient Instructions  Call or return to the clinic with any worsening of symptoms or no resolution. Patient/Parent verbalized understanding and is in agreement. Medication side effects reviewed.   Current Outpatient Medications   Medication Sig Dispense Refill    aspirin 81 MG tablet Take by mouth daily 30 tablet     Cholecalciferol (D3 ADULT PO)       metoprolol succinate ER (TOPROL XL) 100 MG 24 hr tablet Take 1 tablet (100 mg) by mouth daily. 90 tablet 1    metoprolol succinate ER (TOPROL XL) 50 MG 24 hr tablet Take 1 tablet by mouth once daily 90 tablet 1    MULTIPLE VITAMIN PO       rosuvastatin (CRESTOR) 5 MG tablet Take 1 tablet (5 mg) by mouth daily. 90 tablet 3     Chart documentation with  [employer] Voice recognition Software. Although reviewed after completion, some words and grammatical errors may remain.  Claire Amos MSN,[facility]-[address] [address]  [phone]          Estefania Otto is a 74 year old, presenting for the following:  Physical        10/23/2024    10:08 AM   Additional Questions   Roomed by Thelma FERRARA      Hypertension Follow-up    Do you check your blood pressure regularly outside of the clinic? No   Are you following a low salt diet? Yes  Are your blood pressures ever more than 140 on the top number (systolic) OR more   than 90 on the bottom number (diastolic), for example 140/90? No    BP Readings from Last 3 Encounters:   10/23/24 139/70   10/12/23 133/60   12/07/22 130/84      Easy bruising   Etoh use 3 beers daily after work  No extra calcium intake  Osteopenia on last DEXA > 5 yrs   Ate this morning at approx 8 am     No chest pain or SHORTNESS OF BREATH  Previous smoker. Quit years ago.   declined CT lung screening    No previous follow up with neurology in regards to mass seen on imaging. Declined further work up at this time  Due for carotid doppler studies. Has not seen vascular taking statin, ASA and BP medication daily with no SE     Health Care Directive  Patient does not have a Health Care Directive:       10/19/2024   General Health   How would you rate your overall physical health? Excellent   Feel stress (tense, anxious, or unable to sleep) Not at all            10/19/2024   Nutrition   Diet: Regular (no restrictions)            10/19/2024   Exercise   Days per week of moderate/strenous exercise 5 days   Average minutes spent exercising at this level 150+ min            10/19/2024   Social Factors   Frequency of gathering with friends or relatives More than three times a week   Worry food won't last until get money to buy more No   Food not last or not have enough money for food? No   Do you  have housing? (Housing is defined as stable permanent housing and does not include staying ouside in a car, in a tent, in an abandoned building, in an overnight shelter, or couch-surfing.) Yes   Are you worried about losing your housing? No   Lack of transportation? No   Unable to get utilities (heat,electricity)? No            10/19/2024   Fall Risk   Fallen 2 or more times in the past year? No    Trouble with walking or balance? No        Patient-reported          10/19/2024   Activities of Daily Living- Home Safety   Needs help with the following daily activites None of the above   Safety concerns in the home None of the above            10/19/2024   Dental   Dentist two times every year? Yes            10/19/2024   Hearing Screening   Hearing concerns? None of the above            10/19/2024   Driving Risk Screening   Patient/family members have concerns about driving No            10/19/2024   General Alertness/Fatigue Screening   Have you been more tired than usual lately? No            10/19/2024   Urinary Incontinence Screening   Bothered by leaking urine in past 6 months No            10/19/2024   TB Screening   Were you born outside of the [place]? No            Today's PHQ-2 Score:       10/23/2024     9:58 AM   PHQ-2 ( 1999 Pfizer)   Q1: Little interest or pleasure in doing things 0    Q2: Feeling down, depressed or hopeless 0    PHQ-2 Score 0    Q1: Little interest or pleasure in doing things Not at all   Q2: Feeling down, depressed or hopeless Not at all   PHQ-2 Score 0       Patient-reported           10/19/2024   Substance Use   Alcohol more than 3/day or more than 7/wk No   Do you have a current opioid prescription? No   How severe/bad is pain from 1 to 10? 0/10 (No Pain)   Do you use any other substances recreationally? No        Social History     Tobacco Use    Smoking status: Former     Current packs/day: 0.00     Average packs/day: 1 pack/day for 35.0 years (35.0 ttl pk-yrs)     Types: Cigarettes      Start date: 3/13/1977     Quit date: 3/13/2012     Years since quittin.6    Smokeless tobacco: Never   Vaping Use    Vaping status: Never Used   Substance Use Topics    Alcohol use: Yes     Comment: few drinks a week    Drug use: No           2024   LAST FHS-7 RESULTS   1st degree relative breast or ovarian cancer No   Any relative bilateral breast cancer No   Any male have breast cancer No   Any ONE woman have BOTH breast AND ovarian cancer No   Any woman with breast cancer before 50yrs No   2 or more relatives with breast AND/OR ovarian cancer No   2 or more relatives with breast AND/OR bowel cancer No          Mammogram Screening - Mammogram every 1-2 years updated in Health Maintenance based on mutual decision making    ASCVD Risk   The 10-year ASCVD risk score (Richard AGUILAR, et al., 2019) is: 16.5%    Values used to calculate the score:      Age: 74 years      Sex: Female      Is Non- : No      Diabetic: No      Tobacco smoker: No      Systolic Blood Pressure: 139 mmHg      Is BP treated: Yes      HDL Cholesterol: 63 mg/dL      Total Cholesterol: 136 mg/dL        Reviewed and updated as needed this visit by Provider   Tobacco  Allergies  Meds  Problems  Med Hx  Surg Hx  Fam Hx            Past Medical History:   Diagnosis Date    Hyperlipidemia LDL goal <100     Hypertension      Past Surgical History:   Procedure Laterality Date    COLONOSCOPY N/A 2015    Procedure: COLONOSCOPY;  Surgeon: Mayelin Suresh MD;  Location: [facility]    SURGICAL HISTORY OF -       cyst removal from left knee    SURGICAL HISTORY OF -       cryotherapy and laparoscopy - unclear reason why    TUBAL LIGATION      Laparoscopic bilateral tubal ligation     Labs reviewed in EPIC  BP Readings from Last 3 Encounters:   10/23/24 139/70   10/12/23 133/60   22 130/84    Wt Readings from Last 3 Encounters:   10/23/24 53.1 kg (117 lb)   10/12/23 51.3 kg (113 lb)   22 51.3  kg (113 lb)                  Patient Active Problem List   Diagnosis    Chest pain    Hyperlipidemia LDL goal <70    HTN, goal below 140/90    Benign neoplasm of colon    Bilateral carotid artery stenosis    TIA (transient ischemic attack)     Past Surgical History:   Procedure Laterality Date    COLONOSCOPY N/A 2015    Procedure: COLONOSCOPY;  Surgeon: Mayelin Suresh MD;  Location: [facility]    SURGICAL HISTORY OF -       cyst removal from left knee    SURGICAL HISTORY OF -       cryotherapy and laparoscopy - unclear reason why    TUBAL LIGATION      Laparoscopic bilateral tubal ligation       Social History     Tobacco Use    Smoking status: Former     Current packs/day: 0.00     Average packs/day: 1 pack/day for 35.0 years (35.0 ttl pk-yrs)     Types: Cigarettes     Start date: 3/13/1977     Quit date: 3/13/2012     Years since quittin.6    Smokeless tobacco: Never   Substance Use Topics    Alcohol use: Yes     Comment: few drinks a week     Family History   Problem Relation Age of Onset    Cerebrovascular Disease Sister 27    C.A.D. Father 63    Depression Mother     Musculoskeletal Disorder Brother         polio         Current Outpatient Medications   Medication Sig Dispense Refill    aspirin 81 MG tablet Take by mouth daily 30 tablet     Cholecalciferol (D3 ADULT PO)       metoprolol succinate ER (TOPROL XL) 100 MG 24 hr tablet Take 1 tablet (100 mg) by mouth daily. 90 tablet 1    metoprolol succinate ER (TOPROL XL) 50 MG 24 hr tablet Take 1 tablet by mouth once daily 90 tablet 1    MULTIPLE VITAMIN PO       rosuvastatin (CRESTOR) 5 MG tablet Take 1 tablet (5 mg) by mouth daily. 90 tablet 3     No Known Allergies  Current providers sharing in care for this patient include:  Patient Care Team:  Claire Amos APRN CNP as PCP - General (Family Medicine)  Claire Amos APRN CNP as Assigned PCP  Meagan Sotelo APRN CNP as Nurse Practitioner (Neurology)    The following  "health maintenance items are reviewed in Epic and correct as of today:  Health Maintenance   Topic Date Due    RSV VACCINE (1 - Risk 60-74 years 1-dose series) Never done    BMP  11/08/2024    LIPID  11/08/2024    DTAP/TDAP/TD IMMUNIZATION (2 - Td or Tdap) 07/29/2025    COLORECTAL CANCER SCREENING  09/01/2025    MEDICARE ANNUAL WELLNESS VISIT  10/23/2025    ANNUAL REVIEW OF HM ORDERS  10/23/2025    FALL RISK ASSESSMENT  10/23/2025    MAMMO SCREENING  07/31/2026    GLUCOSE  11/08/2026    ADVANCE CARE PLANNING  10/19/2028    DEXA  10/16/2032    HEPATITIS C SCREENING  Completed    PHQ-2 (once per calendar year)  Completed    HPV IMMUNIZATION  Aged Out    MENINGITIS IMMUNIZATION  Aged Out    RSV MONOCLONAL ANTIBODY  Aged Out    INFLUENZA VACCINE  Discontinued    Pneumococcal Vaccine: 65+ Years  Discontinued    ZOSTER IMMUNIZATION  Discontinued    LUNG CANCER SCREENING  Discontinued    COVID-19 Vaccine  Discontinued            Objective    Exam  /70   Pulse 72   Temp 97  F (36.1  C) (Tympanic)   Resp 14   Ht 1.607 m (5' 3.25\")   Wt 53.1 kg (117 lb)   LMP 09/18/2000   SpO2 99%   BMI 20.56 kg/m     Estimated body mass index is 20.56 kg/m  as calculated from the following:    Height as of this encounter: 1.607 m (5' 3.25\").    Weight as of this encounter: 53.1 kg (117 lb).    Physical Exam  GENERAL: alert and no distress  EYES: Eyes grossly normal to inspection, PERRL and conjunctivae and sclerae normal  HENT: ear canals and TM's normal, nose and mouth without ulcers or lesions  NECK: thyroid normal to palpation and carotid bruit noted:  right  RESP: lungs clear to auscultation - no rales, rhonchi or wheezes  CV: regular rate and rhythm, normal S1 S2, no S3 or S4, no murmur, click or rub, no peripheral edema  ABDOMEN: soft, nontender, no hepatosplenomegaly, no masses and bowel sounds normal  MS: no gross musculoskeletal defects noted, no edema  SKIN: no suspicious lesions or rashes  NEURO: Normal strength and " tone, mentation intact and speech normal  PSYCH: mentation appears normal, affect normal/bright         10/23/2024   Mini Cog   Clock Draw Score 2 Normal   3 Item Recall 2 objects recalled   Mini Cog Total Score 4                 Signed Electronically by: LANA Wise CNP

## 2024-10-23 NOTE — PATIENT INSTRUCTIONS
Patient Education   Preventive Care Advice   This is general advice given by our system to help you stay healthy. However, your care team may have specific advice just for you. Please talk to your care team about your preventive care needs.  Nutrition  Eat 5 or more servings of fruits and vegetables each day.  Try wheat bread, brown rice and whole grain pasta (instead of white bread, rice, and pasta).  Get enough calcium and vitamin D. Check the label on foods and aim for 100% of the RDA (recommended daily allowance).  Lifestyle  Exercise at least 150 minutes each week  (30 minutes a day, 5 days a week).  Do muscle strengthening activities 2 days a week. These help control your weight and prevent disease.  No smoking.  Wear sunscreen to prevent skin cancer.  Have a dental exam and cleaning every 6 months.  Yearly exams  See your health care team every year to talk about:  Any changes in your health.  Any medicines your care team has prescribed.  Preventive care, family planning, and ways to prevent chronic diseases.  Shots (vaccines)   HPV shots (up to age 26), if you've never had them before.  Hepatitis B shots (up to age 59), if you've never had them before.  COVID-19 shot: Get this shot when it's due.  Flu shot: Get a flu shot every year.  Tetanus shot: Get a tetanus shot every 10 years.  Pneumococcal, hepatitis A, and RSV shots: Ask your care team if you need these based on your risk.  Shingles shot (for age 50 and up)  General health tests  Diabetes screening:  Starting at age 35, Get screened for diabetes at least every 3 years.  If you are younger than age 35, ask your care team if you should be screened for diabetes.  Cholesterol test: At age 39, start having a cholesterol test every 5 years, or more often if advised.  Bone density scan (DEXA): At age 50, ask your care team if you should have this scan for osteoporosis (brittle bones).  Hepatitis C: Get tested at least once in your life.  STIs (sexually  transmitted infections)  Before age 24: Ask your care team if you should be screened for STIs.  After age 24: Get screened for STIs if you're at risk. You are at risk for STIs (including HIV) if:  You are sexually active with more than one person.  You don't use condoms every time.  You or a partner was diagnosed with a sexually transmitted infection.  If you are at risk for HIV, ask about PrEP medicine to prevent HIV.  Get tested for HIV at least once in your life, whether you are at risk for HIV or not.  Cancer screening tests  Cervical cancer screening: If you have a cervix, begin getting regular cervical cancer screening tests starting at age 21.  Breast cancer scan (mammogram): If you've ever had breasts, begin having regular mammograms starting at age 40. This is a scan to check for breast cancer.  Colon cancer screening: It is important to start screening for colon cancer at age 45.  Have a colonoscopy test every 10 years (or more often if you're at risk) Or, ask your provider about stool tests like a FIT test every year or Cologuard test every 3 years.  To learn more about your testing options, visit:   .  For help making a decision, visit:   https://bit.ly/gl69772.  Prostate cancer screening test: If you have a prostate, ask your care team if a prostate cancer screening test (PSA) at age 55 is right for you.  Lung cancer screening: If you are a current or former smoker ages 50 to 80, ask your care team if ongoing lung cancer screenings are right for you.  For informational purposes only. Not to replace the advice of your health care provider. Copyright   2023 [facility]. All rights reserved. Clinically reviewed by the [facility] Transitions Program. [employer] [id number] - REV 01/24.  Nutrition for Older Adults: Care Instructions  Overview     Good nutrition is important at any age. But it is especially important for older adults. Eating healthy foods helps keep your body strong. And it can  help lower your risk for disease.  As you get older, your body needs more of certain nutrients. These include vitamin B12, calcium, and vitamin D. But it may be harder for you to get these and other important nutrients. This could be for many reasons. You may not feel as hungry as you used to. Or you could have problems with your teeth or mouth that make it hard to chew. Or you may not enjoy planning and preparing meals, especially if you live alone.  Talk with your doctor if you want help getting the most nutrition from what you eat. They may have you work with a dietitian to help you plan meals.  Follow-up care is a key part of your treatment and safety. Be sure to make and go to all appointments, and call your doctor if you are having problems. It's also a good idea to know your test results and keep a list of the medicines you take.  How can you care for yourself at home?  To stay healthy  Eat a variety of foods. The more you vary the foods you eat, the more vitamins, minerals, and other nutrients you get.  Ask your doctor if you should take a multivitamin. Choose one with about 100% of the daily value (DV) for vitamins and minerals. Do not take more than 100% of the daily value for any vitamin or mineral unless your doctor tells you to. Talk with your doctor if you are not sure which multivitamin is right for you.  Try to eat lots of fruits and vegetables. Fresh or frozen vegetables and fruits are healthy choices. Choose canned vegetables that have no salt added and fruits that are canned in their own juice or light syrup.  Include foods that are high in vitamin B12 in your diet. Good choices are fortified breakfast cereal, nonfat or low-fat milk and other dairy products, meat, poultry, fish, and eggs.  Get enough calcium and vitamin D. Good choices include nonfat or low-fat milk, cheese, and yogurt. Other good options are tofu, orange juice with added calcium, and some leafy green vegetables, such as mark  greens and kale. If you don't use milk products, talk to your doctor about calcium and vitamin D supplements.  Try to eat protein foods every day. Good choices include lean meat, fish, poultry, eggs, and cheese. Other good options are cooked beans, peanut butter, and nuts and seeds.  Choose whole grains for half of the grains you eat. Look for 100% whole wheat bread, whole-grain cereals, brown rice, and other whole grains.  If you have constipation  Eat high-fiber foods every day if you can. These include fruits, vegetables, cooked dried beans, and whole grains.  Drink plenty of fluids. If you have kidney, heart, or liver disease and have to limit fluids, talk with your doctor before you increase the amount of fluids you drink.  Ask your doctor if stool softeners may help keep your bowels regular.  If you have mouth problems that make chewing hard  Pick canned or cooked fruits and vegetables. These are often softer.  Chop or shred meat, poultry, and fish. Add sauce or gravy to the meat to help keep it moist.  Pick other protein foods that are soft. These include cheese, peanut butter, cooked beans, cottage cheese, and eggs.  If you have trouble shopping for yourself  Ask a local food store to deliver groceries to your home.  Contact your local area agency on aging and ask about resources that can help.  Ask a family member or neighbor to help you.  If you have trouble preparing meals  Try easier cooking methods such as using a slow cooker or microwave oven.  Let the grocery store do some of the work for you. Look for precut, washed, and ready-to-eat foods.  Take part in group meal programs. You can find these through senior citizen programs.  Have meals brought to your home. Your community may offer programs that deliver meals, such as Meals on Wheels. Or you could use an online meal delivery service.  If you are able, take a cooking class.  If your appetite is poor  Try to eat meals on a regular schedule. It may  "help to eat smaller meals more often throughout the day.  If you can, eat some meals with other people. You could ask family or friends to eat with you. Or you could take part in group meal programs offered in your community.  Ask your doctor if your medicines could cause appetite or taste problems. If so, ask about changing medicines.  Add spices and herbs to increase the flavor of food.  If you think you are depressed, ask your doctor for help. Depression can affect your appetite. And it can make it hard to do everyday activities like grocery shopping and making meals. Treatment can help.  When should you call for help?  Watch closely for changes in your health, and be sure to contact your doctor if you have any problems.  Where can you learn more?  Go to https://www.[employer].net/patiented  Enter L643 in the search box to learn more about \"Nutrition for Older Adults: Care Instructions.\"  Current as of: September 25, 2023  Content Version: 14.2 2024 [email or web address][employer].   Care instructions adapted under license by your healthcare professional. If you have questions about a medical condition or this instruction, always ask your healthcare professional. [employer] disclaims any warranty or liability for your use of this information.    Preventive Care Advice   This is general advice given by our system to help you stay healthy. However, your care team may have specific advice just for you. Please talk to your care team about your preventive care needs.  Nutrition  Eat 5 or more servings of fruits and vegetables each day.  Try wheat bread, brown rice and whole grain pasta (instead of white bread, rice, and pasta).  Get enough calcium and vitamin D. Check the label on foods and aim for 100% of the RDA (recommended daily allowance).  Lifestyle  Exercise at least 150 minutes each week  (30 minutes a day, 5 days a week).  Do muscle strengthening activities 2 days a week. These help control your " weight and prevent disease.  No smoking.  Wear sunscreen to prevent skin cancer.  Have a dental exam and cleaning every 6 months.  Yearly exams  See your health care team every year to talk about:  Any changes in your health.  Any medicines your care team has prescribed.  Preventive care, family planning, and ways to prevent chronic diseases.  Shots (vaccines)   HPV shots (up to age 26), if you've never had them before.  Hepatitis B shots (up to age 59), if you've never had them before.  COVID-19 shot: Get this shot when it's due.  Flu shot: Get a flu shot every year.  Tetanus shot: Get a tetanus shot every 10 years.  Pneumococcal, hepatitis A, and RSV shots: Ask your care team if you need these based on your risk.  Shingles shot (for age 50 and up)  General health tests  Diabetes screening:  Starting at age 35, Get screened for diabetes at least every 3 years.  If you are younger than age 35, ask your care team if you should be screened for diabetes.  Cholesterol test: At age 39, start having a cholesterol test every 5 years, or more often if advised.  Bone density scan (DEXA): At age 50, ask your care team if you should have this scan for osteoporosis (brittle bones).  Hepatitis C: Get tested at least once in your life.  STIs (sexually transmitted infections)  Before age 24: Ask your care team if you should be screened for STIs.  After age 24: Get screened for STIs if you're at risk. You are at risk for STIs (including HIV) if:  You are sexually active with more than one person.  You don't use condoms every time.  You or a partner was diagnosed with a sexually transmitted infection.  If you are at risk for HIV, ask about PrEP medicine to prevent HIV.  Get tested for HIV at least once in your life, whether you are at risk for HIV or not.  Cancer screening tests  Cervical cancer screening: If you have a cervix, begin getting regular cervical cancer screening tests starting at age 21.  Breast cancer scan (mammogram):  If you've ever had breasts, begin having regular mammograms starting at age 40. This is a scan to check for breast cancer.  Colon cancer screening: It is important to start screening for colon cancer at age 45.  Have a colonoscopy test every 10 years (or more often if you're at risk) Or, ask your provider about stool tests like a FIT test every year or Cologuard test every 3 years.  To learn more about your testing options, visit:   .  For help making a decision, visit:   https://bit.ly/nr58603.  Prostate cancer screening test: If you have a prostate, ask your care team if a prostate cancer screening test (PSA) at age 55 is right for you.  Lung cancer screening: If you are a current or former smoker ages 50 to 80, ask your care team if ongoing lung cancer screenings are right for you.  For informational purposes only. Not to replace the advice of your health care provider. Copyright   2023 [employer]. All rights reserved. Clinically reviewed by the [facility] Transitions Program. UNX [id number] - REV 01/24.

## 2024-10-24 ENCOUNTER — PATIENT OUTREACH (OUTPATIENT)
Dept: CARE COORDINATION | Facility: CLINIC | Age: 74
End: 2024-10-24
Payer: COMMERCIAL

## 2024-11-01 ENCOUNTER — TELEPHONE (OUTPATIENT)
Dept: FAMILY MEDICINE | Facility: CLINIC | Age: 74
End: 2024-11-01
Payer: COMMERCIAL

## 2024-11-01 NOTE — TELEPHONE ENCOUNTER
Patient's call transferred to author    Patient requesting clarification on medications prior to her Dexa Scan next week   Relayed appointment preparation instructions to hold multivitamins and supplements 24 hours prior to exam     Patient verbalized understanding  No further questions/concerns    Leonidas Mata, Clinic RN  [facility]

## 2024-11-07 ENCOUNTER — HOSPITAL ENCOUNTER (OUTPATIENT)
Dept: BONE DENSITY | Facility: CLINIC | Age: 74
Discharge: HOME | End: 2024-11-07
Attending: NURSE PRACTITIONER
Payer: COMMERCIAL

## 2024-11-07 ENCOUNTER — HOSPITAL ENCOUNTER (OUTPATIENT)
Dept: ULTRASOUND IMAGING | Facility: CLINIC | Age: 74
Discharge: HOME | End: 2024-11-07
Attending: NURSE PRACTITIONER
Payer: COMMERCIAL

## 2024-11-07 DIAGNOSIS — I65.23 BILATERAL CAROTID ARTERY STENOSIS: ICD-10-CM

## 2024-11-07 DIAGNOSIS — M85.80 OSTEOPENIA, UNSPECIFIED LOCATION: ICD-10-CM

## 2024-11-07 DIAGNOSIS — Z78.0 MENOPAUSE: ICD-10-CM

## 2024-11-07 PROCEDURE — 77080 DXA BONE DENSITY AXIAL: CPT

## 2024-11-07 PROCEDURE — 93880 EXTRACRANIAL BILAT STUDY: CPT

## 2025-01-28 DIAGNOSIS — I10 HTN, GOAL BELOW 140/90: Primary | ICD-10-CM

## 2025-01-28 RX ORDER — METOPROLOL SUCCINATE 100 MG/1
100 TABLET, EXTENDED RELEASE ORAL DAILY
Qty: 90 TABLET | Refills: 0 | Status: SHIPPED | OUTPATIENT
Start: 2025-01-28

## 2025-01-28 RX ORDER — METOPROLOL SUCCINATE 50 MG/1
TABLET, EXTENDED RELEASE ORAL
Qty: 90 TABLET | Refills: 0 | Status: SHIPPED | OUTPATIENT
Start: 2025-01-28

## 2025-01-28 NOTE — TELEPHONE ENCOUNTER
Medication Question or Refill    Contacts       Contact Date/Time Type Contact Phone/Fax    01/28/2025 03:04 PM CST Phone (Incoming) Clarita Lowry (Self) [phone] ()            What medication are you calling about (include dose and sig)?: metoprolol 50mg and 100mg    Preferred Pharmacy:       [facility] [address] [place] [address] [address] [address]  Phone: [phone] Fax: [phone]      Controlled Substance Agreement on file:   CSA -- Patient Level:    CSA: None found at the patient level.       Who prescribed the medication?: LANA Garcia CNP    Do you need a refill? Yes    When did you use the medication last? 1/28/25    Patient offered an appointment? No    Do you have any questions or concerns?  No      Could we send this information to you in [facility] or would you prefer to receive a phone call?:   Patient would prefer a phone call   Okay to leave a detailed message?: Yes at Home number on file [phone] (home)      Bessie Barboza, TREVA Campoverde

## 2025-04-09 DIAGNOSIS — I10 HTN, GOAL BELOW 140/90: ICD-10-CM

## 2025-04-09 RX ORDER — METOPROLOL SUCCINATE 50 MG/1
TABLET, EXTENDED RELEASE ORAL
Qty: 90 TABLET | Refills: 0 | Status: SHIPPED | OUTPATIENT
Start: 2025-04-09

## 2025-04-09 RX ORDER — METOPROLOL SUCCINATE 100 MG/1
100 TABLET, EXTENDED RELEASE ORAL DAILY
Qty: 90 TABLET | Refills: 0 | Status: SHIPPED | OUTPATIENT
Start: 2025-04-09

## 2025-04-09 NOTE — TELEPHONE ENCOUNTER
Needs clinic appointment for further refills.   Prescription approved per [facility] Refill Protocol.  Julie Behrendt RN